# Patient Record
Sex: MALE | Race: WHITE | NOT HISPANIC OR LATINO | URBAN - METROPOLITAN AREA
[De-identification: names, ages, dates, MRNs, and addresses within clinical notes are randomized per-mention and may not be internally consistent; named-entity substitution may affect disease eponyms.]

---

## 2018-08-06 ENCOUNTER — INPATIENT (INPATIENT)
Facility: HOSPITAL | Age: 58
LOS: 9 days | Discharge: HOME | DRG: 949 | End: 2018-08-16
Attending: STUDENT IN AN ORGANIZED HEALTH CARE EDUCATION/TRAINING PROGRAM | Admitting: STUDENT IN AN ORGANIZED HEALTH CARE EDUCATION/TRAINING PROGRAM
Payer: MEDICAID

## 2018-08-06 VITALS
DIASTOLIC BLOOD PRESSURE: 87 MMHG | TEMPERATURE: 99 F | HEIGHT: 68 IN | OXYGEN SATURATION: 96 % | HEART RATE: 90 BPM | SYSTOLIC BLOOD PRESSURE: 136 MMHG | RESPIRATION RATE: 14 BRPM | WEIGHT: 192.68 LBS

## 2018-08-06 DIAGNOSIS — G95.89 OTHER SPECIFIED DISEASES OF SPINAL CORD: ICD-10-CM

## 2018-08-06 DIAGNOSIS — Z90.49 ACQUIRED ABSENCE OF OTHER SPECIFIED PARTS OF DIGESTIVE TRACT: Chronic | ICD-10-CM

## 2018-08-06 RX ORDER — LOSARTAN POTASSIUM 100 MG/1
25 TABLET, FILM COATED ORAL DAILY
Qty: 0 | Refills: 0 | Status: DISCONTINUED | OUTPATIENT
Start: 2018-08-06 | End: 2018-08-16

## 2018-08-06 RX ORDER — DEXAMETHASONE 0.5 MG/5ML
6 ELIXIR ORAL
Qty: 0 | Refills: 0 | Status: COMPLETED | OUTPATIENT
Start: 2018-08-07 | End: 2018-08-07

## 2018-08-06 RX ORDER — CALCIUM CARBONATE 500(1250)
1 TABLET ORAL EVERY 6 HOURS
Qty: 0 | Refills: 0 | Status: DISCONTINUED | OUTPATIENT
Start: 2018-08-06 | End: 2018-08-16

## 2018-08-06 RX ORDER — BACLOFEN 100 %
15 POWDER (GRAM) MISCELLANEOUS EVERY 12 HOURS
Qty: 0 | Refills: 0 | Status: DISCONTINUED | OUTPATIENT
Start: 2018-08-06 | End: 2018-08-09

## 2018-08-06 RX ORDER — AMLODIPINE BESYLATE 2.5 MG/1
5 TABLET ORAL DAILY
Qty: 0 | Refills: 0 | Status: DISCONTINUED | OUTPATIENT
Start: 2018-08-06 | End: 2018-08-16

## 2018-08-06 RX ORDER — ENOXAPARIN SODIUM 100 MG/ML
30 INJECTION SUBCUTANEOUS EVERY 12 HOURS
Qty: 0 | Refills: 0 | Status: DISCONTINUED | OUTPATIENT
Start: 2018-08-06 | End: 2018-08-16

## 2018-08-06 RX ORDER — GABAPENTIN 400 MG/1
600 CAPSULE ORAL
Qty: 0 | Refills: 0 | Status: DISCONTINUED | OUTPATIENT
Start: 2018-08-06 | End: 2018-08-16

## 2018-08-06 RX ORDER — SIMETHICONE 80 MG/1
80 TABLET, CHEWABLE ORAL
Qty: 0 | Refills: 0 | Status: DISCONTINUED | OUTPATIENT
Start: 2018-08-06 | End: 2018-08-16

## 2018-08-06 RX ORDER — DEXAMETHASONE 0.5 MG/5ML
ELIXIR ORAL
Qty: 0 | Refills: 0 | Status: DISCONTINUED | OUTPATIENT
Start: 2018-08-06 | End: 2018-08-06

## 2018-08-06 RX ORDER — ALPRAZOLAM 0.25 MG
0.25 TABLET ORAL THREE TIMES A DAY
Qty: 0 | Refills: 0 | Status: DISCONTINUED | OUTPATIENT
Start: 2018-08-06 | End: 2018-08-11

## 2018-08-06 RX ORDER — PANTOPRAZOLE SODIUM 20 MG/1
40 TABLET, DELAYED RELEASE ORAL
Qty: 0 | Refills: 0 | Status: DISCONTINUED | OUTPATIENT
Start: 2018-08-06 | End: 2018-08-16

## 2018-08-06 RX ORDER — ATORVASTATIN CALCIUM 80 MG/1
40 TABLET, FILM COATED ORAL AT BEDTIME
Qty: 0 | Refills: 0 | Status: DISCONTINUED | OUTPATIENT
Start: 2018-08-06 | End: 2018-08-13

## 2018-08-06 RX ADMIN — Medication 15 MILLIGRAM(S): at 22:55

## 2018-08-06 RX ADMIN — GABAPENTIN 600 MILLIGRAM(S): 400 CAPSULE ORAL at 22:56

## 2018-08-06 NOTE — H&P ADULT - ASSESSMENT
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    COMORBIDITES/ACTIVE MEDICAL ISSUES     Gait Instability, ADL impairments and Functional impairments: start Comprehensive Rehab Program of PT/OT     Neuro / Myelopathy 2/2 Radiation - PT, Bowel and bladder no issues.     Hem-Endo - NET - currently on somatostatin (q4 weeks) will need soon, will need follow up with primary care for repeat     Spasticity - c/w baclofen    Cardio - HTN/HLD - c/w home meds for bp control, pt takes crestor at home and decline atorvastatin.     Endo - DM 2/2 steroids - pt refused glucose monitoring and insulin coverage     Pain - Neuropathic - gabapentin 600mg BID, consider changing to TID for additional relief     GI/Bowel Mgmt -  Continent, moving bowel  well, not on meds; simethicone prn, tums prn     /Bladder Mgmt - Continent    FEN   - Diet - Regular + Thins  ( lactose free) recd CCHO diet but patient refused    Precautions / PROPHYLAXIS:   - Falls  - ortho: Weight bearing status: WBAT   - Lungs: Aspiration, Incentive Spirometer [patient instructed at the bedside]   - Pressure injury/Skin: Turn Q2hrs while in bed, OOB to Chair, PT/OT    - DVT: Lovenox, SCDs, TEDs     MEDICAL PROGNOSIS: GOOD            REHAB POTENTIAL: GOOD             ESTIMATED DISPOSITION: HOME            ELOS: 10-14 Days   EXPECTED THERAPY:     P.T. 1.5 hr/day       O.T. 1.5 hr/day      S.L.P. 0  P&O Unnecessary     EXP FREQUENCY: 5 days per 7 day period     PRESCREEN COMPARISION:   I have reviewed the prescreen information and I have found no relevant changes between the preadmission screening and my post admission evaluation     RATIONALE FOR INPATIENT ADMISSION - Patient demonstrates the following: (check all that apply)  [X] Medically appropriate for rehabilitation admission  [X] Has attainable rehab goals with an appropriate initial discharge plan  [X] Has rehabilitation potential (expected to make a significant improvement within a reasonable period of time)   [X] Requires close medical managment by a rehab physician, rehab nursing care, Hospitalist and comprehensive interdisciplinary team (including PT, OT, & or SLP, Prosthetics and Orthotics)

## 2018-08-06 NOTE — H&P ADULT - NSHPPHYSICALEXAM_GEN_ALL_CORE
PHYSICAL EXAMINATION     General: NAD, Resting Comfortable,  eating a Angie                                 HEENT: NC/AT, EOM I, PERRLA, Normal Conjunctivae  Cardio: RRR, Normal S1-S2, No M/G/R                              Pulm: No Respiratory Distress,  Lungs CTAB                        Abdomen: Distendend - NT, tense but BS+                                                MSK: No joint swelling, Full ROM                                         Ext: No C/C/E, Pulses 2+ throughout, No calf tenderness    Skin:  all skin intact                                                                 Wounds: none  Decubitus Ulcers: None Present     Neurological Examination    Cognitive: AAO x 3                                                                         Attention: Intact   Judgment: Good evidence of judgement                               Memory: Recall 3 objects immediate and 3 min later      Mood/Affect: wnl                                                                           Communication:  Fluent,  No dysarthria   Swallow: intact  CN II - XII  intact  Coordination: FTN/HTS intact on left, unable to perform on the right.                                                                               Sensory: diminished to light touch on the lower extremity                                                                           Tone: abnormal - MAS 1+ Right hamstring   Balance: impaired    Motor    LEFT    UE: SF [5/5], EF [5/5], EE [5/5], WE [5/5],  [wnl]  RIGHT UE: SF [5/5], EF [5/5], EE [5/5], WE [5/5],  [wnl]    LEFT    LE:  HF [5/5], KE [5/5], DF [5/5], EHL [5/5],  PF [5/5]  RIGHT LE:  HF [1/5], KE [4/5], DF [2/5], EHL [1/5],  PF [2/5]

## 2018-08-06 NOTE — H&P ADULT - ATTENDING COMMENTS
Patient discussed with Dr. Mario, PM&R PGY-3. 57 y.o M RHD w/ PMH of Neuroendocrine tumor, Cervical Metastasis s/p Somatostatin Injection,       Coordination: FTN/HTS intact on left, unable to perform on the right.                                                                               	Sensory: diminished to light touch on the lower extremity  	                                                                         	Tone: abnormal - MAS 1+ Right hamstring   	Balance: impaired    	Motor    	LEFT    UE: SF [5/5], EF [5/5], EE [5/5], WE [5/5],  [wnl]  	RIGHT UE: SF [5/5], EF [5/5], EE [5/5], WE [5/5],  [wnl]    	LEFT    LE:  HF [5/5], KE [5/5], DF [5/5], EHL [5/5],  PF [5/5]  RIGHT LE:  HF [1/5], KE [4/5], DF [2/5], EHL [1/5],  PF [2/5] Patient discussed with Dr. Mario, PM&R PGY-3. 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  On my physical exam: Decreased LT to bilateral lower extremities, DTR 3+ B/l Patellar, B/l Ankle Clonus Left 4-6 Beats, R 2-3 Beats, Right Hamstring MAS 1+. BUE 5/5, LLE 5/5 , RLE HF 2/5, KE 3/5, ADL 2/5, APL 3/5, EHL 2/5. Admit Labs with WBC 12k ( likely 2/2 steroid use), anemia and Elevated LFTs.    Admit to Rehab for radiation induced cervical myelopathy, spastic paraparesis, neuropathic pain, neurogenic bowel/bladder, impaired mobility and ADLs. Next Somatostatin Treatment with [facility] ?8/18.  ELOS 10-14 Days.

## 2018-08-06 NOTE — PATIENT PROFILE ADULT. - HEALTHCARE INFORMATION NEEDED, PROFILE
Would like to change room if possible. Needs more privacy. Patient lactose intolerent, can take ice cream, a little bit of milk

## 2018-08-06 NOTE — H&P ADULT - PMH
Anxiety    Endocrine tumor    Essential hypertension    Metastatic cancer    Myelopathy due to another disorder    Small bowel obstruction    Spasticity    Steroid-induced hyperglycemia

## 2018-08-06 NOTE — H&P ADULT - HISTORY OF PRESENT ILLNESS
Patient is a 57y old  Male who presents with a chief complaint of right lower extremity weakness, ADL and functional impairments.     HPI: 57 Man with PMH Of NET of the Small bowel (Dx 2013) s/p resection now with mets ( liver, LNs, Bone and possible C-spine s/p RT to C6-C7 completed 3/28/18), Small bowel obstruction, HTN, who had developed progressive Lower extremity weakness in November 2017. He was recently hospitalized in May 2018 2/2 radiation related myelopathy which initially responded well to steroids (requiring a long taper) and went to [facility] for Acute rehab.  He presented again on 7/24 for worsening weakness R>L lower extremity and decreased functional ability at home as well as low back pain. Weakness attributed to likely radiation treatment. Patient had imaging and showed no evidence of cord compression. Patient initiated back on steroids, patient wants to continue long term treatment with steroids and [place] [facility] [place].       Any Major Surgery within past 100 days? No   Two or > Falls within past one year?  Yes                   One Fall with injury past one year? No     FUNCTIONAL HISTORY  Lives alone in a home, 1 step to enter, can live on 1st level.   Prior Level of Function:  Independent prior with Ambulation and ADLs prior to November 2017 and had progressive weakness. Recently admitted to Acute Rehab at [place] a few months prior.     CURRENT FUNCTIONAL STATUS  - Bed Mobility: Supervision with rolling, bridging    - Transfers: Min A   - Gait: 30 feet with CG x 2 using RW   - ADLs: eval

## 2018-08-06 NOTE — H&P ADULT - NSHPREVIEWOFSYSTEMS_GEN_ALL_CORE
REVIEW OF SYSTEMS  Constitutional: No fever, No Chills, +fatigue  HEENT: No Dysphagia   Pulm: No cough, No shortness of breath  Cardio: No chest pain, No palpitations  GI:  No abdominal pain, No nausea, No vomiting, No diarrhea, No constipation, No incontinence, Last Bowel Movement was today (had 2 BM today which is normal for him)   : No dysuria, No frequency, No hematuria, No incontinence  Neuro: No headaches, No memory loss, +loss of strength, No numbness, No tremors, + Burning pain in feet.   Skin: No itching, No rashes, No lesions   Endo: No temperature intolerance  MSK: No joint pain, No joint swelling, +muscle pain in thigh, No Neck or back pain  Psych:  No depression, No anxiety

## 2018-08-07 LAB
ALBUMIN SERPL ELPH-MCNC: 3.1 G/DL — LOW (ref 3.3–5)
ALP SERPL-CCNC: 71 U/L — SIGNIFICANT CHANGE UP (ref 40–120)
ALT FLD-CCNC: 142 U/L DA — HIGH (ref 10–45)
ANION GAP SERPL CALC-SCNC: 6 MMOL/L — SIGNIFICANT CHANGE UP (ref 5–17)
AST SERPL-CCNC: 50 U/L — HIGH (ref 10–40)
BASOPHILS # BLD AUTO: 0 K/UL — SIGNIFICANT CHANGE UP (ref 0–0.2)
BASOPHILS NFR BLD AUTO: 0.3 % — SIGNIFICANT CHANGE UP (ref 0–2)
BILIRUB SERPL-MCNC: 0.3 MG/DL — SIGNIFICANT CHANGE UP (ref 0.2–1.2)
BUN SERPL-MCNC: 21 MG/DL — SIGNIFICANT CHANGE UP (ref 7–23)
CALCIUM SERPL-MCNC: 8.5 MG/DL — SIGNIFICANT CHANGE UP (ref 8.4–10.5)
CHLORIDE SERPL-SCNC: 102 MMOL/L — SIGNIFICANT CHANGE UP (ref 96–108)
CO2 SERPL-SCNC: 28 MMOL/L — SIGNIFICANT CHANGE UP (ref 22–31)
CREAT SERPL-MCNC: 0.71 MG/DL — SIGNIFICANT CHANGE UP (ref 0.5–1.3)
EOSINOPHIL # BLD AUTO: 0 K/UL — SIGNIFICANT CHANGE UP (ref 0–0.5)
EOSINOPHIL NFR BLD AUTO: 0 % — SIGNIFICANT CHANGE UP (ref 0–6)
GLUCOSE SERPL-MCNC: 101 MG/DL — HIGH (ref 70–99)
HCT VFR BLD CALC: 37.5 % — LOW (ref 39–50)
HGB BLD-MCNC: 12.1 G/DL — LOW (ref 13–17)
LYMPHOCYTES # BLD AUTO: 1.5 K/UL — SIGNIFICANT CHANGE UP (ref 1–3.3)
LYMPHOCYTES # BLD AUTO: 11.7 % — LOW (ref 13–44)
MCHC RBC-ENTMCNC: 29.8 PG — SIGNIFICANT CHANGE UP (ref 27–34)
MCHC RBC-ENTMCNC: 32.1 GM/DL — SIGNIFICANT CHANGE UP (ref 32–36)
MCV RBC AUTO: 92.9 FL — SIGNIFICANT CHANGE UP (ref 80–100)
MONOCYTES # BLD AUTO: 0.8 K/UL — SIGNIFICANT CHANGE UP (ref 0–0.9)
MONOCYTES NFR BLD AUTO: 6.6 % — SIGNIFICANT CHANGE UP (ref 1–9)
NEUTROPHILS # BLD AUTO: 10.2 K/UL — HIGH (ref 1.8–7.4)
NEUTROPHILS NFR BLD AUTO: 81.4 % — HIGH (ref 43–77)
PLATELET # BLD AUTO: 169 K/UL — SIGNIFICANT CHANGE UP (ref 150–400)
POTASSIUM SERPL-MCNC: 4.8 MMOL/L — SIGNIFICANT CHANGE UP (ref 3.5–5.3)
POTASSIUM SERPL-SCNC: 4.8 MMOL/L — SIGNIFICANT CHANGE UP (ref 3.5–5.3)
PROT SERPL-MCNC: 5.6 G/DL — LOW (ref 6–8.3)
RBC # BLD: 4.04 M/UL — LOW (ref 4.2–5.8)
RBC # FLD: 15.1 % — HIGH (ref 10.3–14.5)
SODIUM SERPL-SCNC: 136 MMOL/L — SIGNIFICANT CHANGE UP (ref 135–145)
WBC # BLD: 12.5 K/UL — HIGH (ref 3.8–10.5)
WBC # FLD AUTO: 12.5 K/UL — HIGH (ref 3.8–10.5)

## 2018-08-07 PROCEDURE — 99223 1ST HOSP IP/OBS HIGH 75: CPT

## 2018-08-07 RX ORDER — DEXAMETHASONE 0.5 MG/5ML
4 ELIXIR ORAL
Qty: 0 | Refills: 0 | Status: DISCONTINUED | OUTPATIENT
Start: 2018-08-13 | End: 2018-08-13

## 2018-08-07 RX ORDER — DEXTROSE 50 % IN WATER 50 %
25 SYRINGE (ML) INTRAVENOUS ONCE
Qty: 0 | Refills: 0 | Status: DISCONTINUED | OUTPATIENT
Start: 2018-08-07 | End: 2018-08-16

## 2018-08-07 RX ORDER — DEXTROSE 50 % IN WATER 50 %
12.5 SYRINGE (ML) INTRAVENOUS ONCE
Qty: 0 | Refills: 0 | Status: DISCONTINUED | OUTPATIENT
Start: 2018-08-07 | End: 2018-08-16

## 2018-08-07 RX ORDER — DEXTROSE 50 % IN WATER 50 %
15 SYRINGE (ML) INTRAVENOUS ONCE
Qty: 0 | Refills: 0 | Status: DISCONTINUED | OUTPATIENT
Start: 2018-08-07 | End: 2018-08-16

## 2018-08-07 RX ORDER — INSULIN LISPRO 100/ML
VIAL (ML) SUBCUTANEOUS AT BEDTIME
Qty: 0 | Refills: 0 | Status: DISCONTINUED | OUTPATIENT
Start: 2018-08-07 | End: 2018-08-11

## 2018-08-07 RX ORDER — DEXAMETHASONE 0.5 MG/5ML
4 ELIXIR ORAL
Qty: 0 | Refills: 0 | Status: DISCONTINUED | OUTPATIENT
Start: 2018-08-08 | End: 2018-08-11

## 2018-08-07 RX ORDER — SODIUM CHLORIDE 9 MG/ML
1000 INJECTION, SOLUTION INTRAVENOUS
Qty: 0 | Refills: 0 | Status: DISCONTINUED | OUTPATIENT
Start: 2018-08-07 | End: 2018-08-16

## 2018-08-07 RX ORDER — GLUCAGON INJECTION, SOLUTION 0.5 MG/.1ML
1 INJECTION, SOLUTION SUBCUTANEOUS ONCE
Qty: 0 | Refills: 0 | Status: DISCONTINUED | OUTPATIENT
Start: 2018-08-07 | End: 2018-08-16

## 2018-08-07 RX ORDER — DEXAMETHASONE 0.5 MG/5ML
6 ELIXIR ORAL
Qty: 0 | Refills: 0 | Status: COMPLETED | OUTPATIENT
Start: 2018-08-08 | End: 2018-08-12

## 2018-08-07 RX ORDER — INSULIN LISPRO 100/ML
VIAL (ML) SUBCUTANEOUS
Qty: 0 | Refills: 0 | Status: DISCONTINUED | OUTPATIENT
Start: 2018-08-07 | End: 2018-08-11

## 2018-08-07 RX ADMIN — Medication 0.25 MILLIGRAM(S): at 22:48

## 2018-08-07 RX ADMIN — Medication 15 MILLIGRAM(S): at 06:28

## 2018-08-07 RX ADMIN — LOSARTAN POTASSIUM 25 MILLIGRAM(S): 100 TABLET, FILM COATED ORAL at 06:28

## 2018-08-07 RX ADMIN — AMLODIPINE BESYLATE 5 MILLIGRAM(S): 2.5 TABLET ORAL at 06:28

## 2018-08-07 RX ADMIN — GABAPENTIN 600 MILLIGRAM(S): 400 CAPSULE ORAL at 06:28

## 2018-08-07 RX ADMIN — Medication 6 MILLIGRAM(S): at 17:54

## 2018-08-07 RX ADMIN — Medication 6 MILLIGRAM(S): at 09:28

## 2018-08-07 RX ADMIN — GABAPENTIN 600 MILLIGRAM(S): 400 CAPSULE ORAL at 17:54

## 2018-08-07 RX ADMIN — PANTOPRAZOLE SODIUM 40 MILLIGRAM(S): 20 TABLET, DELAYED RELEASE ORAL at 06:28

## 2018-08-07 RX ADMIN — Medication 0.25 MILLIGRAM(S): at 02:42

## 2018-08-07 RX ADMIN — ENOXAPARIN SODIUM 30 MILLIGRAM(S): 100 INJECTION SUBCUTANEOUS at 17:59

## 2018-08-07 RX ADMIN — Medication 10 MILLIGRAM(S): at 17:58

## 2018-08-07 NOTE — PROGRESS NOTE ADULT - SUBJECTIVE AND OBJECTIVE BOX
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 He was recently hospitalized in May 2018 2/2 radiation related myelopathy which initially responded well to steroids (requiring a long taper) and went to [facility] for Acute rehab.  He presented again on 7/24 for worsening weakness R>L lower extremity and decreased functional ability at home as well as low back pain. Weakness attributed to likely radiation treatment. Patient had imaging and showed no evidence of cord compression. Patient initiated back on steroids, patient wants to continue long term treatment with steroids and declined [place] [facility] [place] but was discharged on taper.     Subjective: Patient seen and examined at bedside. Reports pain is well controlled. Continent of bowel and bladder. Reports he gets to bathroom with help of staff.     [X] Constitutional WNL        [X] Cardio WNL              [X] Resp WNL  [X] GI WNL                        [X]  WNL                   [  ] Heme WNL  [X] Endo WNL                   [X] Skin WNL                  [  ] MSK WNL  [  ] Neuro WNL                 [X] Cognitive WNL          [X] Psych WNL    Physical Exam:  Vital Signs Last 24 Hrs  T(C): 36.3 (07 Aug 2018 07:29), Max: 37.1 (06 Aug 2018 20:01)  T(F): 97.3 (07 Aug 2018 07:29), Max: 98.7 (06 Aug 2018 20:01)  HR: 71 (07 Aug 2018 07:29) (68 - 90)  BP: 156/87 (07 Aug 2018 07:29) (135/79 - 156/87)  BP(mean): --  RR: 14 (07 Aug 2018 07:29) (14 - 14)  SpO2: 96% (07 Aug 2018 07:29) (96% - 97%)    Constitutional - NAD, Comfortable  Chest - CTAB  Cardiovascular - RRR, S1S2, no m/r/g  Abdomen - BS+, Soft, NTND  Extremities - No C/C/E, No calf tenderness   Neurologic Exam -                    Cognitive - Awake, Alert, AAO to self, place, date, year, situation     Communication - Fluent, No dysarthria     Motor - No focal deficits                    LEFT    UE - ShAB 5/5, EF 5/5, EE 5/5, WE 5/5,  5/5                    RIGHT UE - ShAB 5/5, EF 5/5, EE 5/5, WE 5/5,  5/5                    LEFT    LE - HF 5/5, KE 5/5, DF 5/5, PF 5/5                    RIGHT LE - HF 1/5, KE 4+/5, DF 3/5, PF 3/5        Sensory - Intact to LT     Reflexes - 2+ b/l patellar, symmetric  Psychiatric - Mood stable, Affect WNL    FUNCTIONAL STATUS  - Bed Mobility: Supervision with rolling, bridging    - Transfers: Min A   - Gait: 30 feet with CG x 2 using RW   - ADLs: eval (06 Aug 2018 20:59)    MEDICATIONS  (STANDING):  amLODIPine   Tablet 5 milliGRAM(s) Oral daily  atorvastatin 40 milliGRAM(s) Oral at bedtime  baclofen 15 milliGRAM(s) Oral every 12 hours  dexamethasone     Tablet 6 milliGRAM(s) Oral <User Schedule>  dextrose 5%. 1000 milliLiter(s) (50 mL/Hr) IV Continuous <Continuous>  dextrose 50% Injectable 12.5 Gram(s) IV Push once  dextrose 50% Injectable 25 Gram(s) IV Push once  dextrose 50% Injectable 25 Gram(s) IV Push once  enoxaparin Injectable 30 milliGRAM(s) SubCutaneous every 12 hours  gabapentin 600 milliGRAM(s) Oral two times a day  insulin lispro (HumaLOG) corrective regimen sliding scale   SubCutaneous three times a day before meals  insulin lispro (HumaLOG) corrective regimen sliding scale   SubCutaneous at bedtime  losartan 25 milliGRAM(s) Oral daily  multivitamin 1 Tablet(s) Oral daily  pantoprazole    Tablet 40 milliGRAM(s) Oral before breakfast  trimethoprim  160 mG/sulfamethoxazole 800 mG 1 Tablet(s) Oral <User Schedule>    MEDICATIONS  (PRN):  ALPRAZolam 0.25 milliGRAM(s) Oral three times a day PRN anxiety  calcium carbonate    500 mG (Tums) Chewable 1 Tablet(s) Chew every 6 hours PRN Indigestion  dextrose 40% Gel 15 Gram(s) Oral once PRN Blood Glucose LESS THAN 70 milliGRAM(s)/deciliter  glucagon  Injectable 1 milliGRAM(s) IntraMuscular once PRN Glucose LESS THAN 70 milligrams/deciliter  simethicone 80 milliGRAM(s) Chew two times a day PRN Gas    RECENT LABS/IMAGING                        12.1   12.5  )-----------( 169      ( 07 Aug 2018 05:05 )             37.5     08-07    136  |  102  |  21  ----------------------------<  101<H>  4.8   |  28  |  0.71    Ca    8.5      07 Aug 2018 05:05    TPro  5.6<L>  /  Alb  3.1<L>  /  TBili  0.3  /  DBili  x   /  AST  50<H>  /  ALT  142<H>  /  AlkPhos  71  08-07    ASSESSMENT/PLAN  SAM WALTER is a 57 year-old Man with a 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    COMORBIDITES/ACTIVE MEDICAL ISSUES     Rehab: Gait Instability, ADL impairments and Functional impairments: start Comprehensive Rehab Program of PT/OT     Neuro/Myelopathy 2/2 Radiation - PT, Bowel and bladder no issues.   - Spasticity: c/w baclofen  - Pain: Neuropathic - gabapentin 600mg BID  - Anxiety: Xanax prn    Hem-Onc - metastatic NET  - currently on somatostatin (q4 weeks) will need soon, will need follow up with primary care for repeat   - decadron taper; c/w bactrim ppx while on decadron    Cardio - HTN/HLD - c/w home meds for bp control, pt takes crestor at home and decline atorvastatin.     Endo - DM 2/2 steroids   - hypoglycemia protocol and sliding scale    GI/Bowel Mgmt -  Continent, moving bowel  well, not on meds; simethicone prn, tums prn     /Bladder Mgmt - Continent    FEN   - Diet - Regular + Thins  (lactose free) recd CCHO diet but patient refused    Precautions / PROPHYLAXIS:   - Falls  - ortho: Weight bearing status: WBAT   - Lungs: Aspiration, Incentive Spirometer [patient instructed at the bedside]   - Pressure injury/Skin: Turn Q2hrs while in bed, OOB to Chair, PT/OT    - DVT: Lovenox, SCDs    Dispo: Pending team meeting DAILY PROGRESS NOTE:  HPI: 57 Man with 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 He was recently hospitalized in May 2018 2/2 radiation related myelopathy which initially responded well to steroids (requiring a long taper) and went to [facility] for Acute rehab.  He presented again on 7/24 for worsening weakness R>L lower extremity and decreased functional ability at home as well as low back pain. Weakness attributed to likely radiation treatment. Patient had imaging and showed no evidence of cord compression. Patient initiated back on steroids, patient wants to continue long term treatment with steroids and declined [place] [facility] [place] but was discharged on taper.     Subjective:   Patient seen and examined at bedside. Reports pain is well controlled. Continent of bowel and bladder. Reports he gets to bathroom with help of staff.     [X] Constitutional WNL        [X] Cardio WNL              [X] Resp WNL  [X] GI WNL                        [X]  WNL                   [  ] Heme WNL  [X] Endo WNL                   [X] Skin WNL                  [  ] MSK WNL  [  ] Neuro WNL                 [X] Cognitive WNL          [X] Psych WNL    Physical Exam:  Vital Signs Last 24 Hrs  T(C): 36.3 (07 Aug 2018 07:29), Max: 37.1 (06 Aug 2018 20:01)  T(F): 97.3 (07 Aug 2018 07:29), Max: 98.7 (06 Aug 2018 20:01)  HR: 71 (07 Aug 2018 07:29) (68 - 90)  BP: 156/87 (07 Aug 2018 07:29) (135/79 - 156/87)  BP(mean): --  RR: 14 (07 Aug 2018 07:29) (14 - 14)  SpO2: 96% (07 Aug 2018 07:29) (96% - 97%)    Constitutional - NAD, Comfortable  Chest - CTAB  Cardiovascular - RRR, S1S2, no m/r/g  Abdomen - BS+, Soft, NTND  Extremities - No C/C/E, No calf tenderness   Neurologic Exam -                    Cognitive - Awake, Alert, AAO to self, place, date, year, situation     Communication - Fluent, No dysarthria     Motor - No focal deficits                    LEFT    UE - ShAB 5/5, EF 5/5, EE 5/5, WE 5/5,  5/5                    RIGHT UE - ShAB 5/5, EF 5/5, EE 5/5, WE 5/5,  5/5                    LEFT    LE - HF 5/5, KE 5/5, DF 5/5, PF 5/5                    RIGHT LE - HF 1/5, KE 4+/5, DF 3/5, PF 3/5        Sensory - Intact to LT     Reflexes - 2+ b/l patellar, symmetric  Psychiatric - Mood stable, Affect WNL    FUNCTIONAL STATUS  - Bed Mobility: Supervision with rolling, bridging    - Transfers: Min A   - Gait: 30 feet with CG x 2 using RW   - ADLs: eval (06 Aug 2018 20:59)    MEDICATIONS  (STANDING):  amLODIPine   Tablet 5 milliGRAM(s) Oral daily  atorvastatin 40 milliGRAM(s) Oral at bedtime  baclofen 15 milliGRAM(s) Oral every 12 hours  dexamethasone     Tablet 6 milliGRAM(s) Oral <User Schedule>  dextrose 5%. 1000 milliLiter(s) (50 mL/Hr) IV Continuous <Continuous>  dextrose 50% Injectable 12.5 Gram(s) IV Push once  dextrose 50% Injectable 25 Gram(s) IV Push once  dextrose 50% Injectable 25 Gram(s) IV Push once  enoxaparin Injectable 30 milliGRAM(s) SubCutaneous every 12 hours  gabapentin 600 milliGRAM(s) Oral two times a day  insulin lispro (HumaLOG) corrective regimen sliding scale   SubCutaneous three times a day before meals  insulin lispro (HumaLOG) corrective regimen sliding scale   SubCutaneous at bedtime  losartan 25 milliGRAM(s) Oral daily  multivitamin 1 Tablet(s) Oral daily  pantoprazole    Tablet 40 milliGRAM(s) Oral before breakfast  trimethoprim  160 mG/sulfamethoxazole 800 mG 1 Tablet(s) Oral <User Schedule>    MEDICATIONS  (PRN):  ALPRAZolam 0.25 milliGRAM(s) Oral three times a day PRN anxiety  calcium carbonate    500 mG (Tums) Chewable 1 Tablet(s) Chew every 6 hours PRN Indigestion  dextrose 40% Gel 15 Gram(s) Oral once PRN Blood Glucose LESS THAN 70 milliGRAM(s)/deciliter  glucagon  Injectable 1 milliGRAM(s) IntraMuscular once PRN Glucose LESS THAN 70 milligrams/deciliter  simethicone 80 milliGRAM(s) Chew two times a day PRN Gas    RECENT LABS/IMAGING                        12.1   12.5  )-----------( 169      ( 07 Aug 2018 05:05 )             37.5     08-07    136  |  102  |  21  ----------------------------<  101<H>  4.8   |  28  |  0.71    Ca    8.5      07 Aug 2018 05:05    TPro  5.6<L>  /  Alb  3.1<L>  /  TBili  0.3  /  DBili  x   /  AST  50<H>  /  ALT  142<H>  /  AlkPhos  71  08-07    ASSESSMENT/PLAN  SAM WALTER is a 57 year-old Man with a 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    COMORBIDITES/ACTIVE MEDICAL ISSUES     Rehab: Gait Instability, ADL impairments and Functional impairments: start Comprehensive Rehab Program of PT/OT     Neuro/Myelopathy 2/2 Radiation - PT, Bowel and bladder no issues.   - Spasticity: c/w baclofen  - Pain: Neuropathic - gabapentin 600mg BID  - Anxiety: Xanax prn    Hem-Onc - metastatic NET  - currently on somatostatin (q4 weeks) will need soon, will need follow up with primary care for repeat   - decadron taper; c/w bactrim ppx while on decadron    Cardio - HTN/HLD - c/w home meds for bp control, pt takes crestor at home and decline atorvastatin.     Endo - DM 2/2 steroids   - hypoglycemia protocol and sliding scale    GI/Bowel Mgmt -  Continent, moving bowel  well, not on meds; simethicone prn, tums prn     /Bladder Mgmt - Continent    FEN   - Diet - Regular + Thins  (lactose free) recd CCHO diet but patient refused    Precautions / PROPHYLAXIS:   - Falls  - ortho: Weight bearing status: WBAT   - Lungs: Aspiration, Incentive Spirometer [patient instructed at the bedside]   - Pressure injury/Skin: Turn Q2hrs while in bed, OOB to Chair, PT/OT    - DVT: Lovenox, SCDs    Dispo: Pending team meeting

## 2018-08-08 LAB — HBA1C BLD-MCNC: 4.7 % — SIGNIFICANT CHANGE UP (ref 4–5.6)

## 2018-08-08 PROCEDURE — 99232 SBSQ HOSP IP/OBS MODERATE 35: CPT

## 2018-08-08 RX ADMIN — ENOXAPARIN SODIUM 30 MILLIGRAM(S): 100 INJECTION SUBCUTANEOUS at 17:47

## 2018-08-08 RX ADMIN — Medication 4 MILLIGRAM(S): at 17:54

## 2018-08-08 RX ADMIN — Medication 1 TABLET(S): at 08:16

## 2018-08-08 RX ADMIN — GABAPENTIN 600 MILLIGRAM(S): 400 CAPSULE ORAL at 06:50

## 2018-08-08 RX ADMIN — Medication 15 MILLIGRAM(S): at 17:54

## 2018-08-08 RX ADMIN — PANTOPRAZOLE SODIUM 40 MILLIGRAM(S): 20 TABLET, DELAYED RELEASE ORAL at 06:51

## 2018-08-08 RX ADMIN — GABAPENTIN 600 MILLIGRAM(S): 400 CAPSULE ORAL at 17:49

## 2018-08-08 RX ADMIN — Medication 15 MILLIGRAM(S): at 06:51

## 2018-08-08 RX ADMIN — AMLODIPINE BESYLATE 5 MILLIGRAM(S): 2.5 TABLET ORAL at 06:51

## 2018-08-08 RX ADMIN — LOSARTAN POTASSIUM 25 MILLIGRAM(S): 100 TABLET, FILM COATED ORAL at 06:51

## 2018-08-08 RX ADMIN — Medication 6 MILLIGRAM(S): at 08:15

## 2018-08-08 NOTE — PROGRESS NOTE ADULT - SUBJECTIVE AND OBJECTIVE BOX
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 He was recently hospitalized in May 2018 2/2 radiation related myelopathy which initially responded well to steroids (requiring a long taper) and went to [facility] for Acute rehab.  He presented again on 7/24 for worsening weakness R>L lower extremity and decreased functional ability at home as well as low back pain. Weakness attributed to likely radiation treatment. Patient had imaging and showed no evidence of cord compression. Patient initiated back on steroids, patient wants to continue long term treatment with steroids and declined taper at [facility] [place] but was discharged on taper.     Subjective:   Patient seen and examined at bedside. Reports he had one bowel movement this AM. Expressed wanting to get as much therapy as he can. Also requesting to have his Somatostatin injection a few days after discharge from rehab.      [X] Constitutional WNL        [X] Cardio WNL              [X] Resp WNL  [X] GI WNL                        [X]  WNL                   [  ] Heme WNL  [X] Endo WNL                   [X] Skin WNL                  [  ] MSK WNL  [  ] Neuro WNL                 [X] Cognitive WNL          [X] Psych WNL    Physical Exam:  Vital Signs Last 24 Hrs  T(C): 36.6 (08 Aug 2018 07:00), Max: 36.9 (07 Aug 2018 20:11)  T(F): 97.8 (08 Aug 2018 07:00), Max: 98.4 (07 Aug 2018 20:11)  HR: 80 (08 Aug 2018 07:00) (80 - 86)  BP: 137/73 (08 Aug 2018 07:00) (122/76 - 148/87)  BP(mean): --  RR: 15 (08 Aug 2018 07:00) (14 - 15)  SpO2: 95% (08 Aug 2018 07:00) (95% - 97%)    Constitutional - NAD, Comfortable  Chest - CTAB  Cardiovascular - RRR, S1S2, no m/r/g  Abdomen - BS+, Soft, NTND  Extremities - No C/C/E, No calf tenderness   Neurologic Exam -                    Cognitive - Awake, Alert, AAO to self, place, date, year, situation     Communication - Fluent, No dysarthria     Motor - No focal deficits                    LEFT    UE - ShAB 5/5, EF 5/5, EE 5/5, WE 5/5,  5/5                    RIGHT UE - ShAB 5/5, EF 5/5, EE 5/5, WE 5/5,  5/5                    LEFT    LE - HF 5/5, KE 5/5, DF 5/5, PF 5/5                    RIGHT LE - HF 1/5, KE 4+/5, DF 3/5, PF 3/5        Sensory - Intact to LT     Reflexes - 2+ b/l patellar, symmetric  Psychiatric - Mood stable, Affect WNL    FUNCTIONAL STATUS  - Bed Mobility: Supervision with rolling, bridging    - Transfers: Min A   - Gait: 30 feet with CG x 2 using RW   - ADLs: eval (06 Aug 2018 20:59)    MEDICATIONS  (STANDING):  amLODIPine   Tablet 5 milliGRAM(s) Oral daily  atorvastatin 40 milliGRAM(s) Oral at bedtime  baclofen 15 milliGRAM(s) Oral every 12 hours  dexamethasone     Tablet 6 milliGRAM(s) Oral <User Schedule>  dextrose 5%. 1000 milliLiter(s) (50 mL/Hr) IV Continuous <Continuous>  dextrose 50% Injectable 12.5 Gram(s) IV Push once  dextrose 50% Injectable 25 Gram(s) IV Push once  dextrose 50% Injectable 25 Gram(s) IV Push once  enoxaparin Injectable 30 milliGRAM(s) SubCutaneous every 12 hours  gabapentin 600 milliGRAM(s) Oral two times a day  insulin lispro (HumaLOG) corrective regimen sliding scale   SubCutaneous three times a day before meals  insulin lispro (HumaLOG) corrective regimen sliding scale   SubCutaneous at bedtime  losartan 25 milliGRAM(s) Oral daily  multivitamin 1 Tablet(s) Oral daily  pantoprazole    Tablet 40 milliGRAM(s) Oral before breakfast  trimethoprim  160 mG/sulfamethoxazole 800 mG 1 Tablet(s) Oral <User Schedule>    MEDICATIONS  (PRN):  ALPRAZolam 0.25 milliGRAM(s) Oral three times a day PRN anxiety  calcium carbonate    500 mG (Tums) Chewable 1 Tablet(s) Chew every 6 hours PRN Indigestion  dextrose 40% Gel 15 Gram(s) Oral once PRN Blood Glucose LESS THAN 70 milliGRAM(s)/deciliter  glucagon  Injectable 1 milliGRAM(s) IntraMuscular once PRN Glucose LESS THAN 70 milligrams/deciliter  simethicone 80 milliGRAM(s) Chew two times a day PRN Gas    RECENT LABS/IMAGING                        12.1   12.5  )-----------( 169      ( 07 Aug 2018 05:05 )             37.5     08-07    136  |  102  |  21  ----------------------------<  101<H>  4.8   |  28  |  0.71    Ca    8.5      07 Aug 2018 05:05    TPro  5.6<L>  /  Alb  3.1<L>  /  TBili  0.3  /  DBili  x   /  AST  50<H>  /  ALT  142<H>  /  AlkPhos  71  08-07    CAPILLARY BLOOD GLUCOSE      POCT Blood Glucose.: 122 mg/dL (08 Aug 2018 08:04)  POCT Blood Glucose.: 155 mg/dL (07 Aug 2018 21:29)  POCT Blood Glucose.: 125 mg/dL (07 Aug 2018 16:59)  POCT Blood Glucose.: 110 mg/dL (07 Aug 2018 12:17)      ASSESSMENT/PLAN  SAM WALTER is a 57 year-old Man with a 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    COMORBIDITES/ACTIVE MEDICAL ISSUES     Rehab: Gait Instability, ADL impairments and Functional impairments: start Comprehensive Rehab Program of PT/OT     Neuro/Myelopathy 2/2 Radiation - PT, Bowel and bladder no issues.   - Spasticity: c/w baclofen  - Pain: Neuropathic - gabapentin 600mg BID  - Anxiety: Xanax prn    Hem-Onc - metastatic NET  - currently on somatostatin (q4 weeks). Have spoken withRN of Dr. Ayala, Oncologist, ( [phone]) and Somatostatin inject can be delayed by 1 week ( 8/20 or 8/21). Will discuss with rehab team about discharge planning.   - decadron taper; c/w bactrim ppx while on decadron    Cardio - HTN/HLD - c/w home meds for bp control, pt takes crestor at home and decline atorvastatin.     Endo - DM 2/2 steroids   - hypoglycemia protocol and sliding scale    GI/Bowel Mgmt -  Continent, moving bowel  well, not on meds; simethicone prn, tums prn     /Bladder Mgmt - Continent    FEN   - Diet - Regular + Thins  (lactose free) recd CCHO diet but patient refused    Precautions / PROPHYLAXIS:   - Falls  - ortho: Weight bearing status: WBAT   - Lungs: Aspiration, Incentive Spirometer    - Pressure injury/Skin: Turn Q2hrs while in bed, OOB to Chair, PT/OT    - DVT: Lovenox, SCDs    Dispo: Pending team meeting

## 2018-08-09 PROCEDURE — 99233 SBSQ HOSP IP/OBS HIGH 50: CPT

## 2018-08-09 PROCEDURE — 99232 SBSQ HOSP IP/OBS MODERATE 35: CPT

## 2018-08-09 RX ORDER — BACLOFEN 100 %
20 POWDER (GRAM) MISCELLANEOUS EVERY 12 HOURS
Qty: 0 | Refills: 0 | Status: DISCONTINUED | OUTPATIENT
Start: 2018-08-09 | End: 2018-08-14

## 2018-08-09 RX ADMIN — Medication 20 MILLIGRAM(S): at 17:15

## 2018-08-09 RX ADMIN — GABAPENTIN 600 MILLIGRAM(S): 400 CAPSULE ORAL at 06:19

## 2018-08-09 RX ADMIN — LOSARTAN POTASSIUM 25 MILLIGRAM(S): 100 TABLET, FILM COATED ORAL at 06:19

## 2018-08-09 RX ADMIN — GABAPENTIN 600 MILLIGRAM(S): 400 CAPSULE ORAL at 17:14

## 2018-08-09 RX ADMIN — PANTOPRAZOLE SODIUM 40 MILLIGRAM(S): 20 TABLET, DELAYED RELEASE ORAL at 06:19

## 2018-08-09 RX ADMIN — Medication 15 MILLIGRAM(S): at 06:19

## 2018-08-09 RX ADMIN — Medication 0.25 MILLIGRAM(S): at 02:06

## 2018-08-09 RX ADMIN — AMLODIPINE BESYLATE 5 MILLIGRAM(S): 2.5 TABLET ORAL at 06:19

## 2018-08-09 RX ADMIN — Medication 0.25 MILLIGRAM(S): at 23:38

## 2018-08-09 RX ADMIN — Medication 4 MILLIGRAM(S): at 17:14

## 2018-08-09 RX ADMIN — Medication 6 MILLIGRAM(S): at 08:08

## 2018-08-09 RX ADMIN — ENOXAPARIN SODIUM 30 MILLIGRAM(S): 100 INJECTION SUBCUTANEOUS at 06:19

## 2018-08-09 NOTE — PROGRESS NOTE ADULT - SUBJECTIVE AND OBJECTIVE BOX
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 He was recently hospitalized in May 2018 2/2 radiation related myelopathy which initially responded well to steroids (requiring a long taper) and went to [facility] for Acute rehab.  He presented again on 7/24 for worsening weakness R>L lower extremity and decreased functional ability at home as well as low back pain. Weakness attributed to likely radiation treatment. Patient had imaging and showed no evidence of cord compression. Patient initiated back on steroids, patient wants to continue long term treatment with steroids and declined taper at [facility] [place] but was discharged on taper.     Subjective:   Patient seen and examined in PT gym. Denies pain, no complaints. Expressed wanting to get as much therapy as he can, even if his injection was delayed a few days.       [X] Constitutional WNL        [X] Cardio WNL              [X] Resp WNL  [X] GI WNL                        [X]  WNL                   [  ] Heme WNL  [X] Endo WNL                   [X] Skin WNL                  [  ] MSK WNL  [  ] Neuro WNL                 [X] Cognitive WNL          [X] Psych WNL    Physical Exam:  Vital Signs Last 24 Hrs  T(C): 36.6 (08 Aug 2018 07:00), Max: 36.9 (07 Aug 2018 20:11)  T(F): 97.8 (08 Aug 2018 07:00), Max: 98.4 (07 Aug 2018 20:11)  HR: 80 (08 Aug 2018 07:00) (80 - 86)  BP: 137/73 (08 Aug 2018 07:00) (122/76 - 148/87)  BP(mean): --  RR: 15 (08 Aug 2018 07:00) (14 - 15)  SpO2: 95% (08 Aug 2018 07:00) (95% - 97%)    Constitutional - NAD, Comfortable  Chest - CTAB  Cardiovascular - RRR, S1S2, no m/r/g  Abdomen - BS+, Soft, NTND  Extremities - No C/C/E, No calf tenderness   Neurologic Exam -                    Cognitive - Awake, Alert, AAO to self, place, date, year, situation     Communication - Fluent, No dysarthria     Motor - No focal deficits                    LEFT    UE - ShAB 5/5, EF 5/5, EE 5/5, WE 5/5,  5/5                    RIGHT UE - ShAB 5/5, EF 5/5, EE 5/5, WE 5/5,  5/5                    LEFT    LE - HF 5/5, KE 5/5, DF 5/5, PF 5/5                    RIGHT LE - HF 1/5, KE 4+/5, DF 3/5, PF 3/5        Sensory - Intact to LT     Reflexes - 2+ b/l patellar, symmetric  Psychiatric - Mood stable, Affect WNL    FUNCTIONAL STATUS  - Bed Mobility: Supervision with rolling, bridging    - Transfers: Min A  - Gait: 75ft RW CG/min A  - ADLs: Mod A - Sup; Transfers Min A    MEDICATIONS  (STANDING):  amLODIPine   Tablet 5 milliGRAM(s) Oral daily  atorvastatin 40 milliGRAM(s) Oral at bedtime  baclofen 15 milliGRAM(s) Oral every 12 hours  dexamethasone     Tablet 6 milliGRAM(s) Oral with breakfast  dexamethasone     Tablet 4 milliGRAM(s) Oral with dinner  dextrose 5%. 1000 milliLiter(s) (50 mL/Hr) IV Continuous <Continuous>  dextrose 50% Injectable 12.5 Gram(s) IV Push once  dextrose 50% Injectable 25 Gram(s) IV Push once  dextrose 50% Injectable 25 Gram(s) IV Push once  enoxaparin Injectable 30 milliGRAM(s) SubCutaneous every 12 hours  gabapentin 600 milliGRAM(s) Oral two times a day  insulin lispro (HumaLOG) corrective regimen sliding scale   SubCutaneous three times a day before meals  insulin lispro (HumaLOG) corrective regimen sliding scale   SubCutaneous at bedtime  losartan 25 milliGRAM(s) Oral daily  multivitamin 1 Tablet(s) Oral daily  pantoprazole    Tablet 40 milliGRAM(s) Oral before breakfast  trimethoprim  160 mG/sulfamethoxazole 800 mG 1 Tablet(s) Oral <User Schedule>    MEDICATIONS  (PRN):  ALPRAZolam 0.25 milliGRAM(s) Oral three times a day PRN anxiety  calcium carbonate    500 mG (Tums) Chewable 1 Tablet(s) Chew every 6 hours PRN Indigestion  dextrose 40% Gel 15 Gram(s) Oral once PRN Blood Glucose LESS THAN 70 milliGRAM(s)/deciliter  glucagon  Injectable 1 milliGRAM(s) IntraMuscular once PRN Glucose LESS THAN 70 milligrams/deciliter  simethicone 80 milliGRAM(s) Chew two times a day PRN Gas    RECENT LABS/IMAGING                        12.1   12.5  )-----------( 169      ( 07 Aug 2018 05:05 )             37.5     08-07    136  |  102  |  21  ----------------------------<  101<H>  4.8   |  28  |  0.71    Ca    8.5      07 Aug 2018 05:05    TPro  5.6<L>  /  Alb  3.1<L>  /  TBili  0.3  /  DBili  x   /  AST  50<H>  /  ALT  142<H>  /  AlkPhos  71  08-07    ASSESSMENT/PLAN  SAM WALTER is a 57 year-old Man with a 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    COMORBIDITIES/ACTIVE MEDICAL ISSUES     Rehab: Gait Instability, ADL impairments and Functional impairments: start Comprehensive Rehab Program of PT/OT   - R AFO for foot drop    Neuro/Myelopathy 2/2 Radiation - PT, Bowel and bladder no issues.   - Spasticity: c/w baclofen  - Pain: Neuropathic - gabapentin 600mg BID  - Anxiety: Xanax prn    Hem-Onc - metastatic NET  - currently on somatostatin (q4 weeks). Have spoken with RN of Dr. Ayala, Oncologist, ( [phone]) and Somatostatin inject can be delayed by 1 week ( 8/20 or 8/21).    - decadron taper; c/w bactrim ppx while on decadron    Cardio - HTN/HLD - c/w home meds for bp control, pt takes crestor at home and decline atorvastatin.     Endo - DM 2/2 steroids   - hypoglycemia protocol and sliding scale    GI/Bowel Mgmt -  Continent, moving bowel  well, not on meds; simethicone prn, tums prn     /Bladder Mgmt - Continent    FEN   - Diet - Regular + Thins  (lactose free) [facility]    Precautions / PROPHYLAXIS:   - Falls  - ortho: Weight bearing status: WBAT   - Lungs: Aspiration, Incentive Spirometer    - Pressure injury/Skin: Turn Q2hrs while in bed, OOB to Chair, PT/OT    - DVT: Lovenox, SCDs    Dispo: DOMINIK 8/15 to home with home therapies. 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 He was recently hospitalized in May 2018 2/2 radiation related myelopathy which initially responded well to steroids (requiring a long taper) and went to [facility] for Acute rehab.  He presented again on 7/24 for worsening weakness R>L lower extremity and decreased functional ability at home as well as low back pain. Weakness attributed to likely radiation treatment. Patient had imaging and showed no evidence of cord compression. Patient initiated back on steroids, patient wants to continue long term treatment with steroids and declined taper at [facility] but was discharged on taper.     Subjective:   Patient seen and examined in PT gym. Denies pain, no complaints. Expressed wanting to get as much therapy as he can, even if his injection was delayed a few days.       [X] Constitutional WNL        [X] Cardio WNL              [X] Resp WNL  [X] GI WNL                        [X]  WNL                   [  ] Heme WNL  [X] Endo WNL                   [X] Skin WNL                  [  ] MSK WNL  [  ] Neuro WNL                 [X] Cognitive WNL          [X] Psych WNL    Physical Exam:  Vital Signs Last 24 Hrs  T(C): 36.6 (08 Aug 2018 07:00), Max: 36.9 (07 Aug 2018 20:11)  T(F): 97.8 (08 Aug 2018 07:00), Max: 98.4 (07 Aug 2018 20:11)  HR: 80 (08 Aug 2018 07:00) (80 - 86)  BP: 137/73 (08 Aug 2018 07:00) (122/76 - 148/87)  BP(mean): --  RR: 15 (08 Aug 2018 07:00) (14 - 15)  SpO2: 95% (08 Aug 2018 07:00) (95% - 97%)    Constitutional - NAD, Comfortable  Chest - CTAB  Cardiovascular - RRR, S1S2, no m/r/g  Abdomen - BS+, Soft, NTND  Extremities - No C/C/E, No calf tenderness   Neurologic Exam -                    Cognitive - Awake, Alert, AAO to self, place, date, year, situation     Communication - Fluent, No dysarthria     Motor - No focal deficits                    LEFT    UE - ShAB 5/5, EF 5/5, EE 5/5, WE 5/5,  5/5                    RIGHT UE - ShAB 5/5, EF 5/5, EE 5/5, WE 5/5,  5/5                    LEFT    LE - HF 5/5, KE 5/5, DF 5/5, PF 5/5                    RIGHT LE - HF 1/5, KE 4+/5, DF 3/5, PF 3/5        Sensory - Intact to LT     Reflexes - 2+ b/l patellar, symmetric  Psychiatric - Mood stable, Affect WNL    FUNCTIONAL STATUS  - Bed Mobility: Supervision with rolling, bridging    - Transfers: Min A  - Gait: 75ft RW CG/min A  - ADLs: Mod A - Sup; Transfers Min A    MEDICATIONS  (STANDING):  amLODIPine   Tablet 5 milliGRAM(s) Oral daily  atorvastatin 40 milliGRAM(s) Oral at bedtime  baclofen 15 milliGRAM(s) Oral every 12 hours  dexamethasone     Tablet 6 milliGRAM(s) Oral with breakfast  dexamethasone     Tablet 4 milliGRAM(s) Oral with dinner  dextrose 5%. 1000 milliLiter(s) (50 mL/Hr) IV Continuous <Continuous>  dextrose 50% Injectable 12.5 Gram(s) IV Push once  dextrose 50% Injectable 25 Gram(s) IV Push once  dextrose 50% Injectable 25 Gram(s) IV Push once  enoxaparin Injectable 30 milliGRAM(s) SubCutaneous every 12 hours  gabapentin 600 milliGRAM(s) Oral two times a day  insulin lispro (HumaLOG) corrective regimen sliding scale   SubCutaneous three times a day before meals  insulin lispro (HumaLOG) corrective regimen sliding scale   SubCutaneous at bedtime  losartan 25 milliGRAM(s) Oral daily  multivitamin 1 Tablet(s) Oral daily  pantoprazole    Tablet 40 milliGRAM(s) Oral before breakfast  trimethoprim  160 mG/sulfamethoxazole 800 mG 1 Tablet(s) Oral <User Schedule>    MEDICATIONS  (PRN):  ALPRAZolam 0.25 milliGRAM(s) Oral three times a day PRN anxiety  calcium carbonate    500 mG (Tums) Chewable 1 Tablet(s) Chew every 6 hours PRN Indigestion  dextrose 40% Gel 15 Gram(s) Oral once PRN Blood Glucose LESS THAN 70 milliGRAM(s)/deciliter  glucagon  Injectable 1 milliGRAM(s) IntraMuscular once PRN Glucose LESS THAN 70 milligrams/deciliter  simethicone 80 milliGRAM(s) Chew two times a day PRN Gas    RECENT LABS/IMAGING                        12.1   12.5  )-----------( 169      ( 07 Aug 2018 05:05 )             37.5     08-07    136  |  102  |  21  ----------------------------<  101<H>  4.8   |  28  |  0.71    Ca    8.5      07 Aug 2018 05:05    TPro  5.6<L>  /  Alb  3.1<L>  /  TBili  0.3  /  DBili  x   /  AST  50<H>  /  ALT  142<H>  /  AlkPhos  71  08-07    ASSESSMENT/PLAN  SAM WALTER is a 57 year-old Man with a 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    COMORBIDITIES/ACTIVE MEDICAL ISSUES     Rehab: Gait Instability, ADL impairments and Functional impairments: start Comprehensive Rehab Program of PT/OT   - R AFO for foot drop    Neuro/Myelopathy 2/2 Radiation - PT, Bowel and bladder no issues.   - Spasticity: c/w baclofen  - Pain: Neuropathic - gabapentin 600mg BID  - Anxiety: Xanax prn    Hem-Onc - metastatic NET  - currently on somatostatin (q4 weeks). Have spoken with RN of Dr. Ayala, Oncologist, ( [phone]) and Somatostatin inject can be delayed by 1 week ( 8/20 or 8/21).    - decadron taper; c/w bactrim ppx while on decadron    Cardio - HTN/HLD - c/w home meds for bp control, pt takes crestor at home and decline atorvastatin.     Endo - DM 2/2 steroids   - hypoglycemia protocol and sliding scale    GI/Bowel Mgmt -  Continent, moving bowel  well, not on meds; simethicone prn, tums prn     /Bladder Mgmt - Continent    FEN   - Diet - Regular + Thins  (lactose free) [facility]    Precautions / PROPHYLAXIS:   - Falls  - ortho: Weight bearing status: WBAT   - Lungs: Aspiration, Incentive Spirometer    - Pressure injury/Skin: Turn Q2hrs while in bed, OOB to Chair, PT/OT    - DVT: Lovenox, SCDs    Dispo: DOMINIK 8/15 to home with home therapies. Plan to receive Somatostain injection as scheduling per Dr. Hancock after discharge ( after 8/16/18)

## 2018-08-10 PROCEDURE — 99232 SBSQ HOSP IP/OBS MODERATE 35: CPT

## 2018-08-10 PROCEDURE — 99233 SBSQ HOSP IP/OBS HIGH 50: CPT

## 2018-08-10 RX ORDER — ACETAMINOPHEN 500 MG
650 TABLET ORAL EVERY 6 HOURS
Qty: 0 | Refills: 0 | Status: DISCONTINUED | OUTPATIENT
Start: 2018-08-10 | End: 2018-08-16

## 2018-08-10 RX ORDER — ACETAMINOPHEN 500 MG
650 TABLET ORAL EVERY 6 HOURS
Qty: 0 | Refills: 0 | Status: DISCONTINUED | OUTPATIENT
Start: 2018-08-10 | End: 2018-08-10

## 2018-08-10 RX ORDER — DEXAMETHASONE 0.5 MG/5ML
1 ELIXIR ORAL ONCE
Qty: 0 | Refills: 0 | Status: COMPLETED | OUTPATIENT
Start: 2018-08-10 | End: 2018-08-10

## 2018-08-10 RX ORDER — OXYCODONE HYDROCHLORIDE 5 MG/1
5 TABLET ORAL EVERY 4 HOURS
Qty: 0 | Refills: 0 | Status: DISCONTINUED | OUTPATIENT
Start: 2018-08-10 | End: 2018-08-16

## 2018-08-10 RX ADMIN — Medication 4 MILLIGRAM(S): at 18:10

## 2018-08-10 RX ADMIN — Medication 20 MILLIGRAM(S): at 17:50

## 2018-08-10 RX ADMIN — OXYCODONE HYDROCHLORIDE 5 MILLIGRAM(S): 5 TABLET ORAL at 00:45

## 2018-08-10 RX ADMIN — OXYCODONE HYDROCHLORIDE 5 MILLIGRAM(S): 5 TABLET ORAL at 02:25

## 2018-08-10 RX ADMIN — Medication 1 TABLET(S): at 12:26

## 2018-08-10 RX ADMIN — GABAPENTIN 600 MILLIGRAM(S): 400 CAPSULE ORAL at 17:49

## 2018-08-10 RX ADMIN — Medication 1 MILLIGRAM(S): at 21:43

## 2018-08-10 RX ADMIN — Medication 6 MILLIGRAM(S): at 07:42

## 2018-08-10 RX ADMIN — AMLODIPINE BESYLATE 5 MILLIGRAM(S): 2.5 TABLET ORAL at 06:16

## 2018-08-10 RX ADMIN — GABAPENTIN 600 MILLIGRAM(S): 400 CAPSULE ORAL at 06:18

## 2018-08-10 RX ADMIN — Medication 1 TABLET(S): at 07:42

## 2018-08-10 RX ADMIN — PANTOPRAZOLE SODIUM 40 MILLIGRAM(S): 20 TABLET, DELAYED RELEASE ORAL at 06:18

## 2018-08-10 RX ADMIN — LOSARTAN POTASSIUM 25 MILLIGRAM(S): 100 TABLET, FILM COATED ORAL at 06:18

## 2018-08-10 RX ADMIN — Medication 20 MILLIGRAM(S): at 06:17

## 2018-08-10 NOTE — CONSULT NOTE ADULT - NEUROLOGICAL DETAILS
alert and oriented x 3
strength decreased/right sided rle/responds to verbal commands/alert and oriented x 3

## 2018-08-10 NOTE — CONSULT NOTE ADULT - ASSESSMENT
58 yo m admitted 8/6/18 pw right lower extremity weakness  1.	neuroendocrine tumor w mets to c-spine now presenting with worsening right lower extremity weakness with functional impairments and adl impairments slow taper of steroids  2.	myelopathy cw pt and ot   3.	htn: stable cw current meds  4.	neuropathy cw gabapentin  5.	probable hyperglycemia secondary to chronic steroid use ck fs and give corrective insulin  6.	transaminitis monitor   7.	dvt ppx: lovenox
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

## 2018-08-10 NOTE — CONSULT NOTE ADULT - CONSULT REASON
Neuro-endocrine mets
medical management
Neurendocrine tumor mets   SBO hx   Essential htn   HPL   Corticosteroid myopathy

## 2018-08-10 NOTE — PROGRESS NOTE ADULT - SUBJECTIVE AND OBJECTIVE BOX
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 He was recently hospitalized in May 2018 2/2 radiation related myelopathy which initially responded well to steroids (requiring a long taper) and went to [facility] for Acute rehab.  He presented again on 7/24 for worsening weakness R>L lower extremity and decreased functional ability at home as well as low back pain. Weakness attributed to likely radiation treatment. Patient had imaging and showed no evidence of cord compression. Patient initiated back on steroids, patient wants to continue long term treatment with steroids and declined taper at [facility] but was discharged on taper.     Subjective:   Patient seen and examined at bedside. Reports he had pain in his right knee after therapies yesterday, resolved with pain medication. This morning PT reports erythema on bilateral heels. Patient has no other complaints.     [X] Constitutional WNL        [X] Cardio WNL              [X] Resp WNL  [X] GI WNL                        [X]  WNL                   [  ] Heme WNL  [X] Endo WNL                   [X] Skin WNL                  [  ] MSK WNL  [  ] Neuro WNL                 [X] Cognitive WNL          [X] Psych WNL    Physical Exam:  Vital Signs Last 24 Hrs  T(C): 36.7 (10 Aug 2018 07:45), Max: 36.7 (10 Aug 2018 07:45)  T(F): 98 (10 Aug 2018 07:45), Max: 98 (10 Aug 2018 07:45)  HR: 68 (10 Aug 2018 07:45) (68 - 79)  BP: 134/81 (10 Aug 2018 07:45) (113/66 - 134/81)  BP(mean): --  RR: 14 (10 Aug 2018 07:45) (14 - 14)  SpO2: 95% (10 Aug 2018 07:45) (93% - 95%)    Constitutional - NAD, Comfortable  Chest - CTAB  Cardiovascular - RRR, S1S2, no m/r/g  Abdomen - BS+, Soft, NTND  Extremities - No C/C/E, No calf tenderness   Neurologic Exam -                    Cognitive - Awake, Alert, AAO to self, place, date, year, situation     Communication - Fluent, No dysarthria     Motor - No focal deficits                    LEFT    UE - ShAB 5/5, EF 5/5, EE 5/5, WE 5/5,  5/5                    RIGHT UE - ShAB 5/5, EF 5/5, EE 5/5, WE 5/5,  5/5                    LEFT    LE - HF 5/5, KE 5/5, DF 5/5, PF 5/5                    RIGHT LE - HF 1/5, KE 4+/5, DF 3/5, PF 3/5        Sensory - Intact to LT     Reflexes - 2+ b/l patellar, symmetric  Psychiatric - Mood stable, Affect WNL    FUNCTIONAL STATUS  - Bed Mobility: Supervision with rolling, bridging    - Transfers: Min A  - Gait: 75ft RW CG/min A  - ADLs: Mod A - Sup; Transfers Min A    MEDICATIONS  (STANDING):  amLODIPine   Tablet 5 milliGRAM(s) Oral daily  atorvastatin 40 milliGRAM(s) Oral at bedtime  baclofen 20 milliGRAM(s) Oral every 12 hours  dexamethasone     Tablet 6 milliGRAM(s) Oral with breakfast  dexamethasone     Tablet 4 milliGRAM(s) Oral with dinner  dextrose 5%. 1000 milliLiter(s) (50 mL/Hr) IV Continuous <Continuous>  dextrose 50% Injectable 12.5 Gram(s) IV Push once  dextrose 50% Injectable 25 Gram(s) IV Push once  dextrose 50% Injectable 25 Gram(s) IV Push once  enoxaparin Injectable 30 milliGRAM(s) SubCutaneous every 12 hours  gabapentin 600 milliGRAM(s) Oral two times a day  insulin lispro (HumaLOG) corrective regimen sliding scale   SubCutaneous three times a day before meals  insulin lispro (HumaLOG) corrective regimen sliding scale   SubCutaneous at bedtime  losartan 25 milliGRAM(s) Oral daily  multivitamin 1 Tablet(s) Oral daily  pantoprazole    Tablet 40 milliGRAM(s) Oral before breakfast  trimethoprim  160 mG/sulfamethoxazole 800 mG 1 Tablet(s) Oral <User Schedule>    MEDICATIONS  (PRN):  acetaminophen   Tablet. 650 milliGRAM(s) Oral every 6 hours PRN Mild Pain (1 - 3)  ALPRAZolam 0.25 milliGRAM(s) Oral three times a day PRN anxiety  calcium carbonate    500 mG (Tums) Chewable 1 Tablet(s) Chew every 6 hours PRN Indigestion  dextrose 40% Gel 15 Gram(s) Oral once PRN Blood Glucose LESS THAN 70 milliGRAM(s)/deciliter  glucagon  Injectable 1 milliGRAM(s) IntraMuscular once PRN Glucose LESS THAN 70 milligrams/deciliter  oxyCODONE    IR 5 milliGRAM(s) Oral every 4 hours PRN Moderate Pain (4 - 6)  simethicone 80 milliGRAM(s) Chew two times a day PRN Gas    RECENT LABS/IMAGING                  12.1   12.5  )-----------( 169      ( 07 Aug 2018 05:05 )             37.5     08-07    136  |  102  |  21  ----------------------------<  101<H>  4.8   |  28  |  0.71    Ca    8.5      07 Aug 2018 05:05    TPro  5.6<L>  /  Alb  3.1<L>  /  TBili  0.3  /  DBili  x   /  AST  50<H>  /  ALT  142<H>  /  AlkPhos  71  08-07    ASSESSMENT/PLAN  SAM WALTER is a 57 year-old Man with a 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    COMORBIDITIES/ACTIVE MEDICAL ISSUES     Rehab: Gait Instability, ADL impairments and Functional impairments: continue Comprehensive Rehab Program of PT/OT   - R AFO for foot drop    Neuro/Myelopathy 2/2 Radiation - PT, Bowel and bladder no issues.   - Spasticity: c/w baclofen  - Pain: Neuropathic - gabapentin 600mg BID  - Anxiety: Xanax prn    Hem-Onc - metastatic NET  - currently on somatostatin (q4 weeks). Have spoken with RN of Dr. Ayala, Oncologist, ( [phone]) and Somatostatin inject can be delayed by 1 week ( 8/20 or 8/21).    - decadron taper; c/w bactrim ppx while on decadron    Cardio - HTN/HLD - c/w home meds for bp control, pt takes crestor at home and decline atorvastatin.     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 Recommended patient get new sneakers. Podiatry consult.     FEN   - Diet - Regular + Thins  (lactose free) recd CCHO diet but patient refused    Precautions / PROPHYLAXIS:   - Falls  - ortho: Weight bearing status: WBAT   - Lungs: Aspiration, Incentive Spirometer    - Pressure injury/Skin: Turn Q2hrs while in bed, OOB to Chair, PT/OT    - DVT: Lovenox, SCDs    Dispo: [facility] 8/15 to home with home therapies. Plan to receive Somatostain injection as scheduling per Dr. Hancock after discharge ( after 8/16/18)

## 2018-08-10 NOTE — CONSULT NOTE ADULT - SUBJECTIVE AND OBJECTIVE BOX
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 He was recently hospitalized in May 2018 2/2 radiation related myelopathy which initially responded well to steroids (requiring a long taper) and went to [facility] for Acute rehab.  He presented again on 7/24 for worsening weakness R>L lower extremity and decreased functional ability at home as well as low back pain. Weakness attributed to likely radiation treatment. Patient had imaging and showed no evidence of cord compression. Patient initiated back on steroids, patient wants to continue long term treatment with steroids and [place] [facility] [place] on taper.       Any Major Surgery within past 100 days? No   Two or > Falls within past one year?  Yes                   One Fall with injury past one year? No     FUNCTIONAL HISTORY  Lives alone in a home, 1 step to enter, can live on 1st level.   Prior Level of Function:  Independent prior with Ambulation and ADLs prior to November 2017 and had progressive weakness. Recently admitted to Acute Rehab at [place] a few months prior.     CURRENT FUNCTIONAL STATUS  - Bed Mobility: Supervision with rolling, bridging    - Transfers: Min A   - Gait: 30 feet with CG x 2 using RW   - ADLs: eval (06 Aug 2018 20:59)    PAST MEDICAL & SURGICAL HISTORY:  Small bowel obstruction  Myelopathy due to another disorder  Metastatic cancer  Essential hypertension  Spasticity  Anxiety  Steroid-induced hyperglycemia  Endocrine tumor  S/P small bowel resection    SOCIAL HISTORY:  Tobacco Usage:  (   ) never smoked   (   ) former smoker   (   ) current smoker  (     ) pack years    Tobacco Quit Date:  Substance Use (Street drugs): (  ) never used  (  ) other:  Alcohol Usage:    Family history reviewed and otherwise non-contributory    ALLERGIES:  adhesives (Rash)  lactose (Flatulence)  No Known Drug Allergies  seasonal (Rhinorrhea)    MEDICATIONS  (STANDING):  amLODIPine   Tablet 5 milliGRAM(s) Oral daily  atorvastatin 40 milliGRAM(s) Oral at bedtime  baclofen 15 milliGRAM(s) Oral every 12 hours  dexamethasone     Tablet 6 milliGRAM(s) Oral <User Schedule>  enoxaparin Injectable 30 milliGRAM(s) SubCutaneous every 12 hours  gabapentin 600 milliGRAM(s) Oral two times a day  losartan 25 milliGRAM(s) Oral daily  multivitamin 1 Tablet(s) Oral daily  pantoprazole    Tablet 40 milliGRAM(s) Oral before breakfast  trimethoprim  160 mG/sulfamethoxazole 800 mG 1 Tablet(s) Oral <User Schedule>    MEDICATIONS  (PRN):  ALPRAZolam 0.25 milliGRAM(s) Oral three times a day PRN anxiety  calcium carbonate    500 mG (Tums) Chewable 1 Tablet(s) Chew every 6 hours PRN Indigestion  simethicone 80 milliGRAM(s) Chew two times a day PRN Gas    Review of Systems: Refer to HPI for pertinent positives and negatives. All other ROS reviewed and negative except as otherwise stated above.    Vital Signs Last 24 Hrs  T(F): 97.9 (07 Aug 2018 06:19), Max: 98.7 (06 Aug 2018 20:01)  HR: 68 (07 Aug 2018 06:19) (68 - 90)  BP: 135/79 (07 Aug 2018 06:19) (135/79 - 136/87)  RR: 14 (07 Aug 2018 06:19) (14 - 14)  SpO2: 97% (07 Aug 2018 06:19) (96% - 97%)  I&O's Summary    PHYSICAL EXAM:  GENERAL: NAD, well-groomed, well-developed  HEAD:  Atraumatic, Normocephalic  EYES: EOMI, PERRLA, conjunctiva and sclera clear  ENMT: Moist mucous membranes, Good dentition  NECK: Supple, No JVD  CHEST/LUNG: Clear to auscultation bilaterally; No rales, rhonchi, wheezing, or rubs  HEART: Regular rate and rhythm; S1/S2, No murmurs, rubs, or gallops  ABDOMEN: Soft, Nontender, Nondistended; Bowel sounds present  VASCULAR: Normal pulses, Normal capillary refill  EXTREMITIES: No cyanosis, No edema, moves all 4 extremities  LYMPH: No lymphadenopathy noted  SKIN: Warm, Intact  PSYCH: Normal mood and affect  NERVOUS SYSTEM:  A/O x3, Good concentration; right lower  extremity   weakness 1-2/5                    12.1   12.5  )-----------( 169      ( 07 Aug 2018 05:05 )             37.5     08-07    136  |  102  |  21  ----------------------------<  101  4.8   |  28  |  0.71    Ca    8.5      07 Aug 2018 05:05    TPro  5.6  /  Alb  3.1  /  TBili  0.3  /  DBili  x   /  AST  50  /  ALT  142  /  AlkPhos  71  08-07    eGFR if Non African American: 103 mL/min/1.73M2 (08-07-18 @ 05:05)  eGFR if African American: 119 mL/min/1.73M2 (08-07-18 @ 05:05)                  CAPILLARY BLOOD GLUCOSE              RADIOLOGY & ADDITIONAL TESTS:    Care Discussed with Consultants/Other Providers:
seen in the rehab setting     had right knee pain that has resolved with pain meds   occurred overnight   thought to be related to the work out per patient reports     no swelling   no chest pain no sob     ros all others are negative     ICU Vital Signs Last 24 Hrs  T(C): 36.7 (10 Aug 2018 07:45), Max: 36.7 (10 Aug 2018 07:45)  T(F): 98 (10 Aug 2018 07:45), Max: 98 (10 Aug 2018 07:45)  HR: 68 (10 Aug 2018 07:45) (68 - 79)  BP: 134/81 (10 Aug 2018 07:45) (113/66 - 134/81)  BP(mean): --  ABP: --  ABP(mean): --  RR: 14 (10 Aug 2018 07:45) (14 - 14)  SpO2: 95% (10 Aug 2018 07:45) (93% - 95%)      MEDICATIONS  (STANDING):  amLODIPine   Tablet 5 milliGRAM(s) Oral daily  atorvastatin 40 milliGRAM(s) Oral at bedtime  baclofen 20 milliGRAM(s) Oral every 12 hours  dexamethasone     Tablet 6 milliGRAM(s) Oral with breakfast  dexamethasone     Tablet 4 milliGRAM(s) Oral with dinner  dextrose 5%. 1000 milliLiter(s) (50 mL/Hr) IV Continuous <Continuous>  dextrose 50% Injectable 12.5 Gram(s) IV Push once  dextrose 50% Injectable 25 Gram(s) IV Push once  dextrose 50% Injectable 25 Gram(s) IV Push once  enoxaparin Injectable 30 milliGRAM(s) SubCutaneous every 12 hours  gabapentin 600 milliGRAM(s) Oral two times a day  insulin lispro (HumaLOG) corrective regimen sliding scale   SubCutaneous three times a day before meals  insulin lispro (HumaLOG) corrective regimen sliding scale   SubCutaneous at bedtime  losartan 25 milliGRAM(s) Oral daily  multivitamin 1 Tablet(s) Oral daily  pantoprazole    Tablet 40 milliGRAM(s) Oral before breakfast  trimethoprim  160 mG/sulfamethoxazole 800 mG 1 Tablet(s) Oral <User Schedule>    MEDICATIONS  (PRN):  acetaminophen   Tablet. 650 milliGRAM(s) Oral every 6 hours PRN Mild Pain (1 - 3)  ALPRAZolam 0.25 milliGRAM(s) Oral three times a day PRN anxiety  calcium carbonate    500 mG (Tums) Chewable 1 Tablet(s) Chew every 6 hours PRN Indigestion  dextrose 40% Gel 15 Gram(s) Oral once PRN Blood Glucose LESS THAN 70 milliGRAM(s)/deciliter  glucagon  Injectable 1 milliGRAM(s) IntraMuscular once PRN Glucose LESS THAN 70 milligrams/deciliter  oxyCODONE    IR 5 milliGRAM(s) Oral every 4 hours PRN Moderate Pain (4 - 6)  simethicone 80 milliGRAM(s) Chew two times a day PRN Gas
Good spirits       seen post rehab in acute rehab setting     watching baseball history channel     no co     he is realistic about recovery strength, takes it day by day per our conversation   has good insight into his illness treatment plan     ros all others are negative     Vital Signs Last 24 Hrs  T(C): 36.5 (09 Aug 2018 08:48), Max: 36.8 (08 Aug 2018 20:15)  T(F): 97.7 (09 Aug 2018 08:48), Max: 98.3 (08 Aug 2018 20:15)  HR: 79 (09 Aug 2018 08:48) (79 - 86)  BP: 136/86 (09 Aug 2018 08:48) (108/70 - 136/86)  BP(mean): --  RR: 14 (09 Aug 2018 08:48) (14 - 14)  SpO2: 99% (09 Aug 2018 08:48) (96% - 99%)    MEDICATIONS  (STANDING):  amLODIPine   Tablet 5 milliGRAM(s) Oral daily  atorvastatin 40 milliGRAM(s) Oral at bedtime  baclofen 20 milliGRAM(s) Oral every 12 hours  dexamethasone     Tablet 6 milliGRAM(s) Oral with breakfast  dexamethasone     Tablet 4 milliGRAM(s) Oral with dinner  dextrose 5%. 1000 milliLiter(s) (50 mL/Hr) IV Continuous <Continuous>  dextrose 50% Injectable 12.5 Gram(s) IV Push once  dextrose 50% Injectable 25 Gram(s) IV Push once  dextrose 50% Injectable 25 Gram(s) IV Push once  enoxaparin Injectable 30 milliGRAM(s) SubCutaneous every 12 hours  gabapentin 600 milliGRAM(s) Oral two times a day  insulin lispro (HumaLOG) corrective regimen sliding scale   SubCutaneous three times a day before meals  insulin lispro (HumaLOG) corrective regimen sliding scale   SubCutaneous at bedtime  losartan 25 milliGRAM(s) Oral daily  multivitamin 1 Tablet(s) Oral daily  pantoprazole    Tablet 40 milliGRAM(s) Oral before breakfast  trimethoprim  160 mG/sulfamethoxazole 800 mG 1 Tablet(s) Oral <User Schedule>    MEDICATIONS  (PRN):  ALPRAZolam 0.25 milliGRAM(s) Oral three times a day PRN anxiety  calcium carbonate    500 mG (Tums) Chewable 1 Tablet(s) Chew every 6 hours PRN Indigestion  dextrose 40% Gel 15 Gram(s) Oral once PRN Blood Glucose LESS THAN 70 milliGRAM(s)/deciliter  glucagon  Injectable 1 milliGRAM(s) IntraMuscular once PRN Glucose LESS THAN 70 milligrams/deciliter  simethicone 80 milliGRAM(s) Chew two times a day PRN Gas

## 2018-08-11 PROCEDURE — 99232 SBSQ HOSP IP/OBS MODERATE 35: CPT

## 2018-08-11 RX ORDER — DEXAMETHASONE 0.5 MG/5ML
5 ELIXIR ORAL
Qty: 0 | Refills: 0 | Status: COMPLETED | OUTPATIENT
Start: 2018-08-11 | End: 2018-08-12

## 2018-08-11 RX ADMIN — Medication 5 MILLIGRAM(S): at 18:20

## 2018-08-11 RX ADMIN — Medication 20 MILLIGRAM(S): at 18:20

## 2018-08-11 RX ADMIN — LOSARTAN POTASSIUM 25 MILLIGRAM(S): 100 TABLET, FILM COATED ORAL at 06:27

## 2018-08-11 RX ADMIN — AMLODIPINE BESYLATE 5 MILLIGRAM(S): 2.5 TABLET ORAL at 06:27

## 2018-08-11 RX ADMIN — Medication 0.25 MILLIGRAM(S): at 00:27

## 2018-08-11 RX ADMIN — GABAPENTIN 600 MILLIGRAM(S): 400 CAPSULE ORAL at 06:27

## 2018-08-11 RX ADMIN — Medication 20 MILLIGRAM(S): at 06:27

## 2018-08-11 RX ADMIN — GABAPENTIN 600 MILLIGRAM(S): 400 CAPSULE ORAL at 18:20

## 2018-08-11 RX ADMIN — PANTOPRAZOLE SODIUM 40 MILLIGRAM(S): 20 TABLET, DELAYED RELEASE ORAL at 06:27

## 2018-08-11 RX ADMIN — Medication 6 MILLIGRAM(S): at 07:35

## 2018-08-11 NOTE — DIETITIAN INITIAL EVALUATION ADULT. - ENERGY NEEDS
Ht: 68 inches UBW: 166 pounds BMI: 25.23 kg/m2 IBW: 154 (+/-10%) 107.8%IBW  Pertinent information: Pt 58 y/o M with PMH: small bowel obstruction, HTN, anxiety, endocrine tumor, metastatic cancer, myelopathy, hyperglycemia due to steroids, admitted with lower extremity weakness, ADL and functional impairments.   No noted edema as per flow sheets. Skin: no noted pressure injuries as per documentation.

## 2018-08-11 NOTE — DIETITIAN INITIAL EVALUATION ADULT. - ADHERENCE
Pt reports not following any type of diet or restriction at home. Reports taking MVI PTA. Noted pt taking steroids as per chart, pt reports controlled BG, denies taking insulin. Noted HbA1c as per chart (08/08) 4.7%. Pt reports having high cholesterol, states "it is controlled by medications."/n/a

## 2018-08-11 NOTE — DIETITIAN INITIAL EVALUATION ADULT. - OTHER INFO
Pt seen for length of stay initial assessment. Pt reports good appetite and PO intake. Noted 100% PO intake as per flow sheets and breakfast tray at bedside. Denies difficulty chewing/swallowing. Pt denies nausea, vomiting, diarrhea, or constipation, reports last BM today (08/11). Denies weight changes PTA, reports UBW between 166 - 169 pounds. Current weight as per flow sheets (08/06) 192.6 pounds -?accuracy of weight fluctuations likely due to scale error, recommend new weight.

## 2018-08-11 NOTE — DIETITIAN INITIAL EVALUATION ADULT. - NS AS NUTRI INTERV ED CONTENT
Reviewed education on low saturated fat diet to help lower cholesterol. Stressed the importance of limited fried foods and saturated fat consumption. Discussed foods high in saturated fats. Reviewed recommended foods within therapeutic diet. Noted pt knowledgeable about low fat diet. Briefly reviewed education on low saturated fat diet to help lower cholesterol. Stressed the importance of limited fried foods and saturated fat consumption. Discussed foods high in saturated fats. Reviewed recommended foods within therapeutic diet. Noted pt knowledgeable about low fat diet.

## 2018-08-11 NOTE — PROGRESS NOTE ADULT - SUBJECTIVE AND OBJECTIVE BOX
Cc: Gait dysfunction    HPI: Patient with no new medical issues today.  Pain controlled, no chest pain, no N/V, no Fevers/Chills. No other new ROS  Has been tolerating rehabilitation program.  Patient was upset last night regarding steroid taper- increased to 11 mg per day.     acetaminophen   Tablet. 650 milliGRAM(s) Oral every 6 hours PRN  ALPRAZolam 0.25 milliGRAM(s) Oral three times a day PRN  amLODIPine   Tablet 5 milliGRAM(s) Oral daily  atorvastatin 40 milliGRAM(s) Oral at bedtime  baclofen 20 milliGRAM(s) Oral every 12 hours  calcium carbonate    500 mG (Tums) Chewable 1 Tablet(s) Chew every 6 hours PRN  dexamethasone     Tablet 5 milliGRAM(s) Oral with dinner  dexamethasone     Tablet 6 milliGRAM(s) Oral with breakfast  dexamethasone     Tablet 4 milliGRAM(s) Oral with dinner  dextrose 40% Gel 15 Gram(s) Oral once PRN  dextrose 5%. 1000 milliLiter(s) IV Continuous <Continuous>  dextrose 50% Injectable 12.5 Gram(s) IV Push once  dextrose 50% Injectable 25 Gram(s) IV Push once  dextrose 50% Injectable 25 Gram(s) IV Push once  enoxaparin Injectable 30 milliGRAM(s) SubCutaneous every 12 hours  gabapentin 600 milliGRAM(s) Oral two times a day  glucagon  Injectable 1 milliGRAM(s) IntraMuscular once PRN  insulin lispro (HumaLOG) corrective regimen sliding scale   SubCutaneous three times a day before meals  insulin lispro (HumaLOG) corrective regimen sliding scale   SubCutaneous at bedtime  losartan 25 milliGRAM(s) Oral daily  multivitamin 1 Tablet(s) Oral daily  oxyCODONE    IR 5 milliGRAM(s) Oral every 4 hours PRN  pantoprazole    Tablet 40 milliGRAM(s) Oral before breakfast  simethicone 80 milliGRAM(s) Chew two times a day PRN  trimethoprim  160 mG/sulfamethoxazole 800 mG 1 Tablet(s) Oral <User Schedule>      T(C): 36.6 (08-11-18 @ 07:37), Max: 37.1 (08-10-18 @ 22:06)  HR: 68 (08-11-18 @ 07:37) (66 - 68)  BP: 136/70 (08-11-18 @ 07:37) (135/79 - 144/71)  RR: 14 (08-11-18 @ 07:37) (14 - 14)  SpO2: 96% (08-11-18 @ 07:37) (96% - 96%)    In NAD  HEENT- EOMI  Heart- RRR, S1S2  Lungs- CTA bl.  Ext- No calf pain  Neuro- Exam unchanged    Imp: Patient with diagnosis of  cervical myelopathy admitted for comprehensive acute rehabilitation.    Plan:  - Continue PT/OT/SLP  - DVT prophylaxis- lovenox  - Skin- Turn q2h, check skin daily  - CVS- Stable  - Patient is stable to continue current rehabilitation program.

## 2018-08-12 PROCEDURE — 99232 SBSQ HOSP IP/OBS MODERATE 35: CPT

## 2018-08-12 RX ADMIN — GABAPENTIN 600 MILLIGRAM(S): 400 CAPSULE ORAL at 17:28

## 2018-08-12 RX ADMIN — Medication 6 MILLIGRAM(S): at 08:17

## 2018-08-12 RX ADMIN — SIMETHICONE 80 MILLIGRAM(S): 80 TABLET, CHEWABLE ORAL at 17:30

## 2018-08-12 RX ADMIN — Medication 5 MILLIGRAM(S): at 17:27

## 2018-08-12 RX ADMIN — GABAPENTIN 600 MILLIGRAM(S): 400 CAPSULE ORAL at 06:45

## 2018-08-12 RX ADMIN — Medication 20 MILLIGRAM(S): at 17:28

## 2018-08-12 RX ADMIN — LOSARTAN POTASSIUM 25 MILLIGRAM(S): 100 TABLET, FILM COATED ORAL at 06:46

## 2018-08-12 RX ADMIN — ENOXAPARIN SODIUM 30 MILLIGRAM(S): 100 INJECTION SUBCUTANEOUS at 17:29

## 2018-08-12 RX ADMIN — AMLODIPINE BESYLATE 5 MILLIGRAM(S): 2.5 TABLET ORAL at 06:46

## 2018-08-12 RX ADMIN — PANTOPRAZOLE SODIUM 40 MILLIGRAM(S): 20 TABLET, DELAYED RELEASE ORAL at 06:46

## 2018-08-12 RX ADMIN — Medication 20 MILLIGRAM(S): at 06:46

## 2018-08-12 NOTE — PROGRESS NOTE ADULT - SUBJECTIVE AND OBJECTIVE BOX
Cc: Gait dysfunction    HPI: Patient with no new medical issues today.  Pain controlled, no chest pain, no N/V, no Fevers/Chills. No other new ROS  Has been tolerating rehabilitation program.    MEDICATIONS  (STANDING):  amLODIPine   Tablet 5 milliGRAM(s) Oral daily  atorvastatin 40 milliGRAM(s) Oral at bedtime  baclofen 20 milliGRAM(s) Oral every 12 hours  dexamethasone     Tablet 5 milliGRAM(s) Oral with dinner  dexamethasone     Tablet 6 milliGRAM(s) Oral with breakfast  dextrose 5%. 1000 milliLiter(s) (50 mL/Hr) IV Continuous <Continuous>  dextrose 50% Injectable 12.5 Gram(s) IV Push once  dextrose 50% Injectable 25 Gram(s) IV Push once  dextrose 50% Injectable 25 Gram(s) IV Push once  enoxaparin Injectable 30 milliGRAM(s) SubCutaneous every 12 hours  gabapentin 600 milliGRAM(s) Oral two times a day  losartan 25 milliGRAM(s) Oral daily  multivitamin 1 Tablet(s) Oral daily  pantoprazole    Tablet 40 milliGRAM(s) Oral before breakfast  trimethoprim  160 mG/sulfamethoxazole 800 mG 1 Tablet(s) Oral <User Schedule>    MEDICATIONS  (PRN):  acetaminophen   Tablet. 650 milliGRAM(s) Oral every 6 hours PRN Mild Pain (1 - 3)  ALPRAZolam 0.25 milliGRAM(s) Oral three times a day PRN anxiety  calcium carbonate    500 mG (Tums) Chewable 1 Tablet(s) Chew every 6 hours PRN Indigestion  dextrose 40% Gel 15 Gram(s) Oral once PRN Blood Glucose LESS THAN 70 milliGRAM(s)/deciliter  glucagon  Injectable 1 milliGRAM(s) IntraMuscular once PRN Glucose LESS THAN 70 milligrams/deciliter  oxyCODONE    IR 5 milliGRAM(s) Oral every 4 hours PRN Moderate Pain (4 - 6)  simethicone 80 milliGRAM(s) Chew two times a day PRN Gas    Vital Signs Last 24 Hrs  T(C): 36.7 (12 Aug 2018 06:51), Max: 36.7 (11 Aug 2018 22:52)  T(F): 98.1 (12 Aug 2018 06:51), Max: 98.1 (12 Aug 2018 06:51)  HR: 70 (12 Aug 2018 06:51) (70 - 72)  BP: 145/82 (12 Aug 2018 06:51) (142/81 - 145/82)  BP(mean): --  RR: 14 (12 Aug 2018 06:51) (14 - 14)  SpO2: 97% (12 Aug 2018 06:51) (97% - 98%)    In NAD  HEENT- EOMI  Heart- RRR, S1S2  Lungs- CTA bl.  Ext- No calf pain  Neuro- Exam unchanged    Imp: Patient with diagnosis of  cervical myelopathy admitted for comprehensive acute rehabilitation.    Plan:  - Continue PT/OT/SLP  - DVT prophylaxis- lovenox  - Skin- Turn q2h, check skin daily  - CVS- Stable  - Patient is stable to continue current rehabilitation program.

## 2018-08-13 PROCEDURE — 99233 SBSQ HOSP IP/OBS HIGH 50: CPT | Mod: GC

## 2018-08-13 PROCEDURE — 99232 SBSQ HOSP IP/OBS MODERATE 35: CPT

## 2018-08-13 RX ORDER — DEXAMETHASONE 0.5 MG/5ML
5 ELIXIR ORAL
Qty: 0 | Refills: 0 | Status: COMPLETED | OUTPATIENT
Start: 2018-08-13 | End: 2018-08-16

## 2018-08-13 RX ORDER — ROSUVASTATIN CALCIUM 5 MG/1
10 TABLET ORAL AT BEDTIME
Qty: 0 | Refills: 0 | Status: DISCONTINUED | OUTPATIENT
Start: 2018-08-13 | End: 2018-08-16

## 2018-08-13 RX ADMIN — Medication 20 MILLIGRAM(S): at 05:14

## 2018-08-13 RX ADMIN — PANTOPRAZOLE SODIUM 40 MILLIGRAM(S): 20 TABLET, DELAYED RELEASE ORAL at 05:14

## 2018-08-13 RX ADMIN — GABAPENTIN 600 MILLIGRAM(S): 400 CAPSULE ORAL at 05:13

## 2018-08-13 RX ADMIN — Medication 5 MILLIGRAM(S): at 11:02

## 2018-08-13 RX ADMIN — Medication 5 MILLIGRAM(S): at 17:27

## 2018-08-13 RX ADMIN — Medication 1 TABLET(S): at 08:58

## 2018-08-13 RX ADMIN — AMLODIPINE BESYLATE 5 MILLIGRAM(S): 2.5 TABLET ORAL at 05:14

## 2018-08-13 RX ADMIN — LOSARTAN POTASSIUM 25 MILLIGRAM(S): 100 TABLET, FILM COATED ORAL at 05:14

## 2018-08-13 RX ADMIN — Medication 1 TABLET(S): at 11:00

## 2018-08-13 RX ADMIN — Medication 20 MILLIGRAM(S): at 17:26

## 2018-08-13 RX ADMIN — ROSUVASTATIN CALCIUM 10 MILLIGRAM(S): 5 TABLET ORAL at 21:02

## 2018-08-13 RX ADMIN — ENOXAPARIN SODIUM 30 MILLIGRAM(S): 100 INJECTION SUBCUTANEOUS at 17:27

## 2018-08-13 RX ADMIN — GABAPENTIN 600 MILLIGRAM(S): 400 CAPSULE ORAL at 17:26

## 2018-08-13 NOTE — PROGRESS NOTE ADULT - ASSESSMENT
Pt is a 56yo M is admitted to acute rehab for radiation induced myopathy.     *radiation induced myelopathy  PT/OT  Continue steroid duong  Bactrim TIW for ppx while on steroid  Continue Gabapentin and Baclofen   Pain management     *Neuroendocrine tumor with mets   Continue steroid  Injection at Cancer center this thr  Follow up as out patient     *HLD  Cont lipitor     *HTN  Controlled  Continue current meds     *Anxiety  Xanex prn     *prophylactic measure  DVT ppx Lovenox   GI ppx Protonix  Steroid ppx Bactrim

## 2018-08-13 NOTE — PROGRESS NOTE ADULT - SUBJECTIVE AND OBJECTIVE BOX
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 He was recently hospitalized in May 2018 2/2 radiation related myelopathy which initially responded well to steroids (requiring a long taper) and went to [facility] for Acute rehab.  He presented again on 7/24 for worsening weakness R>L lower extremity and decreased functional ability at home as well as low back pain. Weakness attributed to likely radiation treatment. Patient had imaging and showed no evidence of cord compression. Patient initiated back on steroids, patient wants to continue long term treatment with steroids and declined taper at [facility] [place] but was discharged on taper.     Subjective:   Patient seen and examined at bedside. Reports improved strength of RLE. Continues to express concern over dexamethasone taper.    [X] Constitutional WNL        [X] Cardio WNL              [X] Resp WNL  [X] GI WNL                        [X]  WNL                   [  ] Heme WNL  [X] Endo WNL                   [X] Skin WNL                  [  ] MSK WNL  [  ] Neuro WNL                 [X] Cognitive WNL          [X] Psych WNL    Physical Exam:  Vital Signs Last 24 Hrs  T(C): 36.7 (13 Aug 2018 07:57), Max: 36.7 (13 Aug 2018 05:17)  T(F): 98 (13 Aug 2018 07:57), Max: 98 (13 Aug 2018 05:17)  HR: 66 (13 Aug 2018 07:57) (66 - 82)  BP: 131/81 (13 Aug 2018 07:57) (129/77 - 140/82)  BP(mean): --  RR: 14 (13 Aug 2018 07:57) (14 - 14)  SpO2: 95% (13 Aug 2018 07:57) (95% - 97%)    Constitutional - NAD, Comfortable  Chest - CTAB  Cardiovascular - RRR, S1S2, no m/r/g  Abdomen - BS+, Soft, NTND  Extremities - No C/C/E, No calf tenderness   Neurologic Exam -                    Cognitive - Awake, Alert, AAO to self, place, date, year, situation     Communication - Fluent, No dysarthria     Motor - No focal deficits                    LEFT    UE - ShAB 5/5, EF 5/5, EE 5/5, WE 5/5,  5/5                    RIGHT UE - ShAB 5/5, EF 5/5, EE 5/5, WE 5/5,  5/5                    LEFT    LE - HF 5/5, KE 5/5, DF 5/5, PF 5/5                    RIGHT LE - HF 1/5, KE 4+/5, DF 3/5, PF 3/5        Sensory - Intact to LT     Reflexes - 2+ b/l patellar, symmetric  Psychiatric - Mood stable, Affect WNL    FUNCTIONAL STATUS  - Bed Mobility: Supervision with rolling, bridging    - Transfers: Min A  - Gait: 75ft RW CG/min A  - ADLs: Mod A - Sup; Transfers Min A    MEDICATIONS  (STANDING):  amLODIPine   Tablet 5 milliGRAM(s) Oral daily  atorvastatin 40 milliGRAM(s) Oral at bedtime  baclofen 20 milliGRAM(s) Oral every 12 hours  dexamethasone     Tablet 5 milliGRAM(s) Oral two times a day  dextrose 5%. 1000 milliLiter(s) (50 mL/Hr) IV Continuous <Continuous>  dextrose 50% Injectable 12.5 Gram(s) IV Push once  dextrose 50% Injectable 25 Gram(s) IV Push once  dextrose 50% Injectable 25 Gram(s) IV Push once  enoxaparin Injectable 30 milliGRAM(s) SubCutaneous every 12 hours  gabapentin 600 milliGRAM(s) Oral two times a day  losartan 25 milliGRAM(s) Oral daily  multivitamin 1 Tablet(s) Oral daily  pantoprazole    Tablet 40 milliGRAM(s) Oral before breakfast  trimethoprim  160 mG/sulfamethoxazole 800 mG 1 Tablet(s) Oral <User Schedule>    MEDICATIONS  (PRN):  acetaminophen   Tablet. 650 milliGRAM(s) Oral every 6 hours PRN Mild Pain (1 - 3)  ALPRAZolam 0.25 milliGRAM(s) Oral three times a day PRN anxiety  calcium carbonate    500 mG (Tums) Chewable 1 Tablet(s) Chew every 6 hours PRN Indigestion  dextrose 40% Gel 15 Gram(s) Oral once PRN Blood Glucose LESS THAN 70 milliGRAM(s)/deciliter  glucagon  Injectable 1 milliGRAM(s) IntraMuscular once PRN Glucose LESS THAN 70 milligrams/deciliter  oxyCODONE    IR 5 milliGRAM(s) Oral every 4 hours PRN Moderate Pain (4 - 6)  simethicone 80 milliGRAM(s) Chew two times a day PRN Gas    RECENT LABS/IMAGING           ASSESSMENT/PLAN  SAM WALTER is a 57 year-old Man with a 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    COMORBIDITIES/ACTIVE MEDICAL ISSUES     Rehab: Gait Instability, ADL impairments and Functional impairments: continue Comprehensive Rehab Program of PT/OT   - R AFO for foot drop    Neuro/Myelopathy 2/2 Radiation - PT, Bowel and bladder no issues.   - Spasticity: c/w baclofen  - Pain: Neuropathic - gabapentin 600mg BID  - Anxiety: Xanax prn    Hem-Onc - metastatic NET  - currently on somatostatin (q4 weeks). Have spoken with RN of Dr. Ayala, Oncologist, ( [phone]) and Somatostatin inject can be delayed by 1 week ( 8/20 or 8/21).    - decadron taper; c/w bactrim ppx while on decadron  - Patient refused decadron on Friday evening until he was given an extra 1mg, said he did not want to drop from 12mg daily to 10mg daily. Per chart review, patient had already been tapered to 10mg daily since 8/8. Patient continued to deny taking 10mg total daily dose, insisted on receiving 11mg total. Discussed decadron taper with patient this morning, will decrease to 10mg total daily dose until discharge. Patient can then follow up with oncology for further management.     Cardio - HTN/HLD - c/w home meds for bp control, pt takes crestor at home and decline atorvastatin.     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 Recommended patient get new sneakers. Podiatry consult.     FEN   - Diet - Regular + Thins  (lactose free) recd CCHO diet but patient refused    Precautions / PROPHYLAXIS:   - Falls  - ortho: Weight bearing status: WBAT   - Lungs: Aspiration, Incentive Spirometer    - Pressure injury/Skin: Turn Q2hrs while in bed, OOB to Chair, PT/OT    - DVT: Lovenox, SCDs    Dispo: [facility] 8/15 to home with home therapies. Plan to receive Somatostain injection as scheduling per Dr. Hancock after discharge ( after 8/16/18)

## 2018-08-13 NOTE — PROGRESS NOTE ADULT - SUBJECTIVE AND OBJECTIVE BOX
Pt is a 56yo M is admitted to acute rehab for radiation induced myopathy.   Pt was seen and examined in chair. Pt states his right leg still weaker than left, but is much stronger than before. Pt is excited to be discharged this Wed to home. Hemodynamically stable, BGM controlled    Vital Signs Last 24 Hrs  T(C): 36.7 (13 Aug 2018 07:57), Max: 36.7 (13 Aug 2018 05:17)  T(F): 98 (13 Aug 2018 07:57), Max: 98 (13 Aug 2018 05:17)  HR: 66 (13 Aug 2018 07:57) (66 - 82)  BP: 131/81 (13 Aug 2018 07:57) (129/77 - 140/82)  BP(mean): --  RR: 14 (13 Aug 2018 07:57) (14 - 14)  SpO2: 95% (13 Aug 2018 07:57) (95% - 97%)    I&O's Summary    12 Aug 2018 07:01  -  13 Aug 2018 07:00  --------------------------------------------------------  IN: 500 mL / OUT: 360 mL / NET: 140 mL        CAPILLARY BLOOD GLUCOSE          PHYSICAL EXAM:    Constitutional: NAD, awake  HEENT: PERR, EOMI  Neck: Soft and supple  Respiratory: Breath sounds are clear bilaterally  Cardiovascular: S1 and S2  Gastrointestinal: Bowel Sounds present, soft, nontender  Extremities: No peripheral edema  Vascular: 2+ peripheral pulses  Neurological: A/O x 3    MEDICATIONS:  MEDICATIONS  (STANDING):  amLODIPine   Tablet 5 milliGRAM(s) Oral daily  atorvastatin 40 milliGRAM(s) Oral at bedtime  baclofen 20 milliGRAM(s) Oral every 12 hours  dexamethasone     Tablet 4 milliGRAM(s) Oral with breakfast  dextrose 5%. 1000 milliLiter(s) (50 mL/Hr) IV Continuous <Continuous>  dextrose 50% Injectable 12.5 Gram(s) IV Push once  dextrose 50% Injectable 25 Gram(s) IV Push once  dextrose 50% Injectable 25 Gram(s) IV Push once  enoxaparin Injectable 30 milliGRAM(s) SubCutaneous every 12 hours  gabapentin 600 milliGRAM(s) Oral two times a day  losartan 25 milliGRAM(s) Oral daily  multivitamin 1 Tablet(s) Oral daily  pantoprazole    Tablet 40 milliGRAM(s) Oral before breakfast  trimethoprim  160 mG/sulfamethoxazole 800 mG 1 Tablet(s) Oral <User Schedule>      LABS: All Labs Reviewed:                Blood Culture:     RADIOLOGY/EKG:    DVT PPX:    ADVANCED DIRECTIVE:    DISPOSITION:

## 2018-08-14 LAB
ALBUMIN SERPL ELPH-MCNC: 3.2 G/DL — LOW (ref 3.3–5)
ALP SERPL-CCNC: 74 U/L — SIGNIFICANT CHANGE UP (ref 40–120)
ALT FLD-CCNC: 294 U/L DA — HIGH (ref 10–45)
ANION GAP SERPL CALC-SCNC: 3 MMOL/L — LOW (ref 5–17)
AST SERPL-CCNC: 70 U/L — HIGH (ref 10–40)
BILIRUB SERPL-MCNC: 0.3 MG/DL — SIGNIFICANT CHANGE UP (ref 0.2–1.2)
BUN SERPL-MCNC: 21 MG/DL — SIGNIFICANT CHANGE UP (ref 7–23)
CALCIUM SERPL-MCNC: 8.9 MG/DL — SIGNIFICANT CHANGE UP (ref 8.4–10.5)
CHLORIDE SERPL-SCNC: 97 MMOL/L — SIGNIFICANT CHANGE UP (ref 96–108)
CO2 SERPL-SCNC: 31 MMOL/L — SIGNIFICANT CHANGE UP (ref 22–31)
CREAT SERPL-MCNC: 0.7 MG/DL — SIGNIFICANT CHANGE UP (ref 0.5–1.3)
GLUCOSE SERPL-MCNC: 116 MG/DL — HIGH (ref 70–99)
HCT VFR BLD CALC: 39.7 % — SIGNIFICANT CHANGE UP (ref 39–50)
HGB BLD-MCNC: 12.4 G/DL — LOW (ref 13–17)
MCHC RBC-ENTMCNC: 29.9 PG — SIGNIFICANT CHANGE UP (ref 27–34)
MCHC RBC-ENTMCNC: 31.4 GM/DL — LOW (ref 32–36)
MCV RBC AUTO: 95.2 FL — SIGNIFICANT CHANGE UP (ref 80–100)
PLATELET # BLD AUTO: 126 K/UL — LOW (ref 150–400)
POTASSIUM SERPL-MCNC: 4.5 MMOL/L — SIGNIFICANT CHANGE UP (ref 3.5–5.3)
POTASSIUM SERPL-SCNC: 4.5 MMOL/L — SIGNIFICANT CHANGE UP (ref 3.5–5.3)
PROT SERPL-MCNC: 6 G/DL — SIGNIFICANT CHANGE UP (ref 6–8.3)
RBC # BLD: 4.16 M/UL — LOW (ref 4.2–5.8)
RBC # FLD: 14.7 % — HIGH (ref 10.3–14.5)
SODIUM SERPL-SCNC: 131 MMOL/L — LOW (ref 135–145)
WBC # BLD: 10.4 K/UL — SIGNIFICANT CHANGE UP (ref 3.8–10.5)
WBC # FLD AUTO: 10.4 K/UL — SIGNIFICANT CHANGE UP (ref 3.8–10.5)

## 2018-08-14 PROCEDURE — 99233 SBSQ HOSP IP/OBS HIGH 50: CPT | Mod: GC

## 2018-08-14 PROCEDURE — 93970 EXTREMITY STUDY: CPT | Mod: 26

## 2018-08-14 PROCEDURE — 99232 SBSQ HOSP IP/OBS MODERATE 35: CPT

## 2018-08-14 PROCEDURE — 93010 ELECTROCARDIOGRAM REPORT: CPT

## 2018-08-14 RX ORDER — BACLOFEN 100 %
20 POWDER (GRAM) MISCELLANEOUS AT BEDTIME
Qty: 0 | Refills: 0 | Status: DISCONTINUED | OUTPATIENT
Start: 2018-08-14 | End: 2018-08-16

## 2018-08-14 RX ORDER — BACLOFEN 100 %
15 POWDER (GRAM) MISCELLANEOUS DAILY
Qty: 0 | Refills: 0 | Status: DISCONTINUED | OUTPATIENT
Start: 2018-08-14 | End: 2018-08-16

## 2018-08-14 RX ADMIN — OXYCODONE HYDROCHLORIDE 5 MILLIGRAM(S): 5 TABLET ORAL at 07:00

## 2018-08-14 RX ADMIN — Medication 20 MILLIGRAM(S): at 06:28

## 2018-08-14 RX ADMIN — PANTOPRAZOLE SODIUM 40 MILLIGRAM(S): 20 TABLET, DELAYED RELEASE ORAL at 06:29

## 2018-08-14 RX ADMIN — Medication 5 MILLIGRAM(S): at 18:02

## 2018-08-14 RX ADMIN — AMLODIPINE BESYLATE 5 MILLIGRAM(S): 2.5 TABLET ORAL at 06:28

## 2018-08-14 RX ADMIN — Medication 5 MILLIGRAM(S): at 07:00

## 2018-08-14 RX ADMIN — Medication 20 MILLIGRAM(S): at 22:09

## 2018-08-14 RX ADMIN — GABAPENTIN 600 MILLIGRAM(S): 400 CAPSULE ORAL at 06:29

## 2018-08-14 RX ADMIN — ENOXAPARIN SODIUM 30 MILLIGRAM(S): 100 INJECTION SUBCUTANEOUS at 06:29

## 2018-08-14 RX ADMIN — ENOXAPARIN SODIUM 30 MILLIGRAM(S): 100 INJECTION SUBCUTANEOUS at 18:02

## 2018-08-14 RX ADMIN — GABAPENTIN 600 MILLIGRAM(S): 400 CAPSULE ORAL at 18:02

## 2018-08-14 RX ADMIN — ROSUVASTATIN CALCIUM 10 MILLIGRAM(S): 5 TABLET ORAL at 22:08

## 2018-08-14 RX ADMIN — LOSARTAN POTASSIUM 25 MILLIGRAM(S): 100 TABLET, FILM COATED ORAL at 06:28

## 2018-08-14 NOTE — PROGRESS NOTE ADULT - SUBJECTIVE AND OBJECTIVE BOX
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 He was recently hospitalized in May 2018 2/2 radiation related myelopathy which initially responded well to steroids (requiring a long taper) and went to [facility] for Acute rehab.  He presented again on  for worsening weakness R>L lower extremity and decreased functional ability at home as well as low back pain. Weakness attributed to likely radiation treatment. Patient had imaging and showed no evidence of cord compression. Patient initiated back on steroids, patient wants to continue long term treatment with steroids and declined taper at [facility] but was discharged on taper.     Subjective:   Patient seen and examined in hallway on way to PT. Reports knee pain this morning attributed to using G-EO yesterday, improved with pain medication. Expresses concerns regarding buckling of his legs in the shower this morning, requesting more time in rehab to get used to his new AFO. Also reports increased swelling of RLE today.     [X] Constitutional WNL        [X] Cardio WNL              [X] Resp WNL  [X] GI WNL                        [X]  WNL                   [  ] Heme WNL  [X] Endo WNL                   [X] Skin WNL                  [  ] MSK WNL  [  ] Neuro WNL                 [X] Cognitive WNL          [X] Psych WNL    Physical Exam:  Vital Signs Last 24 Hrs  T(C): 36.8 (14 Aug 2018 09:14), Max: 36.8 (14 Aug 2018 09:14)  T(F): 98.2 (14 Aug 2018 09:14), Max: 98.2 (14 Aug 2018 09:14)  HR: 79 (14 Aug 2018 09:14) (73 - 79)  BP: 125/79 (14 Aug 2018 09:14) (109/68 - 143/88)  BP(mean): --  RR: 14 (14 Aug 2018 09:14) (14 - 14)  SpO2: 93% (14 Aug 2018 09:14) (93% - 94%)    Constitutional - NAD, Comfortable  Chest - CTAB  Cardiovascular - RRR, S1S2, no m/r/g  Abdomen - BS+, Soft, NTND  Extremities - right ankle edema  Neurologic Exam -                    Cognitive - Awake, Alert, AAO to self, place, date, year, situation     Communication - Fluent, No dysarthria     Motor - No focal deficits                    LEFT    UE - ShAB 5/5, EF 5/5, EE 5/5, WE 5/5,  5/5                    RIGHT UE - ShAB 5/5, EF 5/5, EE 5/5, WE 5/5,  5/5                    LEFT    LE - HF 5/5, KE 5/5, DF 5/5, PF 5/5                    RIGHT LE - HF 1/5, KE 4+/5, DF 3/5, PF 3/5        Sensory - Intact to LT     Reflexes - 2+ b/l patellar, symmetric  Psychiatric - Mood stable, Affect WNL    FUNCTIONAL STATUS  - Bed Mobility: Supervision with rolling, bridging    - Transfers: Min A  - Gait: 75ft RW CG/min A  - ADLs: Mod A - Sup; Transfers Min A    MEDICATIONS  (STANDING):  amLODIPine   Tablet 5 milliGRAM(s) Oral daily  baclofen 20 milliGRAM(s) Oral every 12 hours  dexamethasone     Tablet 5 milliGRAM(s) Oral two times a day  dextrose 5%. 1000 milliLiter(s) (50 mL/Hr) IV Continuous <Continuous>  dextrose 50% Injectable 12.5 Gram(s) IV Push once  dextrose 50% Injectable 25 Gram(s) IV Push once  dextrose 50% Injectable 25 Gram(s) IV Push once  enoxaparin Injectable 30 milliGRAM(s) SubCutaneous every 12 hours  gabapentin 600 milliGRAM(s) Oral two times a day  losartan 25 milliGRAM(s) Oral daily  multivitamin 1 Tablet(s) Oral daily  pantoprazole    Tablet 40 milliGRAM(s) Oral before breakfast  rosuvastatin 10 milliGRAM(s) Oral at bedtime  trimethoprim  160 mG/sulfamethoxazole 800 mG 1 Tablet(s) Oral <User Schedule>    MEDICATIONS  (PRN):  acetaminophen   Tablet. 650 milliGRAM(s) Oral every 6 hours PRN Mild Pain (1 - 3)  calcium carbonate    500 mG (Tums) Chewable 1 Tablet(s) Chew every 6 hours PRN Indigestion  dextrose 40% Gel 15 Gram(s) Oral once PRN Blood Glucose LESS THAN 70 milliGRAM(s)/deciliter  glucagon  Injectable 1 milliGRAM(s) IntraMuscular once PRN Glucose LESS THAN 70 milligrams/deciliter  oxyCODONE    IR 5 milliGRAM(s) Oral every 4 hours PRN Moderate Pain (4 - 6)  simethicone 80 milliGRAM(s) Chew two times a day PRN Gas    RECENT LABS/IMAGIN.4   10.4  )-----------( 126      ( 14 Aug 2018 05:05 )             39.7       131<L>  |  97  |  21  ----------------------------<  116<H>  4.5   |  31  |  0.70    Ca    8.9      14 Aug 2018 05:05    TPro  6.0  /  Alb  3.2<L>  /  TBili  0.3  /  DBili  x   /  AST  70<H>  /  ALT  294<H>  /  AlkPhos  74      ASSESSMENT/PLAN  SAM WALTER is a 57 year-old Man with a 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    COMORBIDITIES/ACTIVE MEDICAL ISSUES     Rehab: Gait Instability, ADL impairments and Functional impairments: continue Comprehensive Rehab Program of PT/OT   - R AFO for foot drop    Neuro/Myelopathy 2/2 Radiation - PT, Bowel and bladder no issues.   - Spasticity: c/w baclofen  - Pain: Neuropathic - gabapentin 600mg BID  - Anxiety: Xanax prn    Hem-Onc - metastatic NET  - currently on somatostatin (q4 weeks). Have spoken with RN of Dr. Ayala, Oncologist, ( [phone]) and Somatostatin inject can be delayed by 1 week (  or ).    - decadron taper; c/w bactrim ppx while on decadron  - Patient refused decadron on Friday evening until he was given an extra 1mg, said he did not want to drop from 12mg daily to 10mg daily. Per chart review, patient had already been tapered to 10mg daily since . Patient continued to deny taking 10mg total daily dose, insisted on receiving 11mg total. Discussed decadron taper with patient this morning, will decrease to 10mg total daily dose until discharge. Patient can then follow up with oncology for further management.     Cardio - HTN/HLD - c/w home meds for bp control, pt takes crestor at home and decline atorvastatin.     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 Recommended patient get new sneakers. Podiatry consult.     FEN   - Diet - Regular + Thins  (lactose free) recd CCHO diet but patient refused    Precautions / PROPHYLAXIS:   - Falls  - ortho: Weight bearing status: WBAT   - Lungs: Aspiration, Incentive Spirometer    - Pressure injury/Skin: Turn Q2hrs while in bed, OOB to Chair, PT/OT    - DVT: Lovenox, SCDs    Dispo: [facility] 8/15 to home with home therapies. Plan to receive Somatostain injection as scheduling per Dr. 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 He was recently hospitalized in May 2018 2/2 radiation related myelopathy which initially responded well to steroids (requiring a long taper) and went to [facility] for Acute rehab.  He presented again on  for worsening weakness R>L lower extremity and decreased functional ability at home as well as low back pain. Weakness attributed to likely radiation treatment. Patient had imaging and showed no evidence of cord compression. Patient initiated back on steroids, patient wants to continue long term treatment with steroids and declined taper at [facility] but was discharged on taper.     Subjective:   Patient seen and examined in hallway on way to PT. Reports knee pain this morning attributed to using G-EO yesterday, improved with pain medication. Expresses concerns regarding buckling of his legs in the shower this morning, requesting more time in rehab to get used to his new AFO. Also reports increased swelling of RLE today.     [X] Constitutional WNL        [X] Cardio WNL              [X] Resp WNL  [X] GI WNL                        [X]  WNL                   [  ] Heme WNL  [X] Endo WNL                   [X] Skin WNL                  [  ] MSK WNL  [  ] Neuro WNL                 [X] Cognitive WNL          [X] Psych WNL    Physical Exam:  Vital Signs Last 24 Hrs  T(C): 36.8 (14 Aug 2018 09:14), Max: 36.8 (14 Aug 2018 09:14)  T(F): 98.2 (14 Aug 2018 09:14), Max: 98.2 (14 Aug 2018 09:14)  HR: 79 (14 Aug 2018 09:14) (73 - 79)  BP: 125/79 (14 Aug 2018 09:14) (109/68 - 143/88)  BP(mean): --  RR: 14 (14 Aug 2018 09:14) (14 - 14)  SpO2: 93% (14 Aug 2018 09:14) (93% - 94%)    Constitutional - NAD, Comfortable  Chest - CTAB  Cardiovascular - RRR, S1S2, no m/r/g  Abdomen - BS+, Soft, NTND  Extremities - right ankle edema  Neurologic Exam -                    Cognitive - Awake, Alert, AAO to self, place, date, year, situation     Communication - Fluent, No dysarthria     Motor - No focal deficits                    LEFT    UE - ShAB 5/5, EF 5/5, EE 5/5, WE 5/5,  5/5                    RIGHT UE - ShAB 5/5, EF 5/5, EE 5/5, WE 5/5,  5/5                    LEFT    LE - HF 5/5, KE 5/5, DF 5/5, PF 5/5                    RIGHT LE - HF 1/5, KE 4+/5, DF 3/5, PF 3/5        Sensory - Intact to LT     Reflexes - 2+ b/l patellar, symmetric  Psychiatric - Mood stable, Affect WNL    FUNCTIONAL STATUS  - Bed Mobility: Supervision with rolling, bridging    - Transfers: Min A  - Gait: 75ft RW CG/min A  - ADLs: Mod A - Sup; Transfers Min A    MEDICATIONS  (STANDING):  amLODIPine   Tablet 5 milliGRAM(s) Oral daily  baclofen 20 milliGRAM(s) Oral every 12 hours  dexamethasone     Tablet 5 milliGRAM(s) Oral two times a day  dextrose 5%. 1000 milliLiter(s) (50 mL/Hr) IV Continuous <Continuous>  dextrose 50% Injectable 12.5 Gram(s) IV Push once  dextrose 50% Injectable 25 Gram(s) IV Push once  dextrose 50% Injectable 25 Gram(s) IV Push once  enoxaparin Injectable 30 milliGRAM(s) SubCutaneous every 12 hours  gabapentin 600 milliGRAM(s) Oral two times a day  losartan 25 milliGRAM(s) Oral daily  multivitamin 1 Tablet(s) Oral daily  pantoprazole    Tablet 40 milliGRAM(s) Oral before breakfast  rosuvastatin 10 milliGRAM(s) Oral at bedtime  trimethoprim  160 mG/sulfamethoxazole 800 mG 1 Tablet(s) Oral <User Schedule>    MEDICATIONS  (PRN):  acetaminophen   Tablet. 650 milliGRAM(s) Oral every 6 hours PRN Mild Pain (1 - 3)  calcium carbonate    500 mG (Tums) Chewable 1 Tablet(s) Chew every 6 hours PRN Indigestion  dextrose 40% Gel 15 Gram(s) Oral once PRN Blood Glucose LESS THAN 70 milliGRAM(s)/deciliter  glucagon  Injectable 1 milliGRAM(s) IntraMuscular once PRN Glucose LESS THAN 70 milligrams/deciliter  oxyCODONE    IR 5 milliGRAM(s) Oral every 4 hours PRN Moderate Pain (4 - 6)  simethicone 80 milliGRAM(s) Chew two times a day PRN Gas    RECENT LABS/IMAGIN.4   10.4  )-----------( 126      ( 14 Aug 2018 05:05 )             39.7       131<L>  |  97  |  21  ----------------------------<  116<H>  4.5   |  31  |  0.70    Ca    8.9      14 Aug 2018 05:05    TPro  6.0  /  Alb  3.2<L>  /  TBili  0.3  /  DBili  x   /  AST  70<H>  /  ALT  294<H>  /  AlkPhos  74      ASSESSMENT/PLAN  SAM WALTER is a 57 year-old Man with a 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    COMORBIDITIES/ACTIVE MEDICAL ISSUES     Rehab: Gait Instability, ADL impairments and Functional impairments: continue Comprehensive Rehab Program of PT/OT   - R AFO for foot drop    Neuro/Myelopathy 2/2 Radiation - PT, Bowel and bladder no issues.   - Spasticity: Increasing baclofen for LE spasticity  - Pain: Neuropathic - gabapentin 600mg BID  - Anxiety: Xanax prn    Hem-Onc - metastatic NET  - currently on somatostatin (q4 weeks). Have spoken with RN of Dr. Ayala, Oncologist, ( [phone]) and Somatostatin inject can be delayed by 1 week (  or ).    - decadron taper; c/w bactrim ppx while on decadron  - Patient refused decadron on Friday evening until he was given an extra 1mg, said he did not want to drop from 12mg daily to 10mg daily. Per chart review, patient had already been tapered to 10mg daily since . Patient continued to deny taking 10mg total daily dose, insisted on receiving 11mg total. Discussed decadron taper with pt, decreased to 10mg total daily dose until discharge. Patient can then follow up with oncology for further management.     Cardio - HTN/HLD - c/w home meds for bp control, pt takes crestor at home and decline atorvastatin.     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 Recommended patient get new sneakers. Podiatry consult.     FEN   - Diet - Regular + Thins  (lactose free) recd CCHO diet but patient refused    Precautions / PROPHYLAXIS:   - Falls  - ortho: Weight bearing status: WBAT   - Lungs: Aspiration, Incentive Spirometer    - Pressure injury/Skin: Turn Q2hrs while in bed, OOB to Chair, PT/OT    - DVT: Lovenox, SCDs; f/u LE dopplers given increased RLE swelling.     Dispo: DOM[facility] 8/15 to home with home therapies. Plan to receive Somatostain injection as scheduling per Dr. 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 He was recently hospitalized in May 2018 2/2 radiation related myelopathy which initially responded well to steroids (requiring a long taper) and went to [place] for Acute rehab.  He presented again on  for worsening weakness R>L lower extremity and decreased functional ability at home as well as low back pain. Weakness attributed to likely radiation treatment. Patient had imaging and showed no evidence of cord compression. Patient initiated back on steroids, patient wants to continue long term treatment with steroids and declined taper at [facility] but was discharged on taper.     Subjective:   Patient seen and examined in hallway on way to PT. Reports knee pain this morning attributed to using G-EO yesterday, improved with pain medication. Expresses concerns regarding buckling of his legs in the shower this morning, requesting more time in rehab to get used to his new AFO. Also reports increased swelling of RLE today.     [X] Constitutional WNL        [X] Cardio WNL              [X] Resp WNL  [X] GI WNL                        [X]  WNL                   [  ] Heme WNL  [X] Endo WNL                   [X] Skin WNL                  [  ] MSK WNL  [  ] Neuro WNL                 [X] Cognitive WNL          [X] Psych WNL    Physical Exam:  Vital Signs Last 24 Hrs  T(C): 36.8 (14 Aug 2018 09:14), Max: 36.8 (14 Aug 2018 09:14)  T(F): 98.2 (14 Aug 2018 09:14), Max: 98.2 (14 Aug 2018 09:14)  HR: 79 (14 Aug 2018 09:14) (73 - 79)  BP: 125/79 (14 Aug 2018 09:14) (109/68 - 143/88)  BP(mean): --  RR: 14 (14 Aug 2018 09:14) (14 - 14)  SpO2: 93% (14 Aug 2018 09:14) (93% - 94%)    Constitutional - NAD, Comfortable  Chest - CTAB  Cardiovascular - RRR, S1S2, no m/r/g  Abdomen - BS+, Soft, NTND  Extremities - right ankle edema  Neurologic Exam -                    Cognitive - Awake, Alert, AAO to self, place, date, year, situation     Communication - Fluent, No dysarthria     Motor - No focal deficits                    LEFT    UE - ShAB 5/5, EF 5/5, EE 5/5, WE 5/5,  5/5                    RIGHT UE - ShAB 5/5, EF 5/5, EE 5/5, WE 5/5,  5/5                    LEFT    LE - HF 5/5, KE 5/5, DF 5/5, PF 5/5                    RIGHT LE - HF 1/5, KE 4+/5, DF 3/5, PF 3/5        Sensory - Intact to LT     Reflexes - 2+ b/l patellar, symmetric     Tone: Right Hamstring MAS 2+  Psychiatric - Mood stable, Affect WNL    FUNCTIONAL STATUS  - Bed Mobility: Supervision with rolling, bridging    - Transfers: Min A  - Gait: 75ft RW CG/min A  - ADLs: Mod A - Sup; Transfers Min A    MEDICATIONS  (STANDING):  amLODIPine   Tablet 5 milliGRAM(s) Oral daily  baclofen 20 milliGRAM(s) Oral every 12 hours  dexamethasone     Tablet 5 milliGRAM(s) Oral two times a day  dextrose 5%. 1000 milliLiter(s) (50 mL/Hr) IV Continuous <Continuous>  dextrose 50% Injectable 12.5 Gram(s) IV Push once  dextrose 50% Injectable 25 Gram(s) IV Push once  dextrose 50% Injectable 25 Gram(s) IV Push once  enoxaparin Injectable 30 milliGRAM(s) SubCutaneous every 12 hours  gabapentin 600 milliGRAM(s) Oral two times a day  losartan 25 milliGRAM(s) Oral daily  multivitamin 1 Tablet(s) Oral daily  pantoprazole    Tablet 40 milliGRAM(s) Oral before breakfast  rosuvastatin 10 milliGRAM(s) Oral at bedtime  trimethoprim  160 mG/sulfamethoxazole 800 mG 1 Tablet(s) Oral <User Schedule>    MEDICATIONS  (PRN):  acetaminophen   Tablet. 650 milliGRAM(s) Oral every 6 hours PRN Mild Pain (1 - 3)  calcium carbonate    500 mG (Tums) Chewable 1 Tablet(s) Chew every 6 hours PRN Indigestion  dextrose 40% Gel 15 Gram(s) Oral once PRN Blood Glucose LESS THAN 70 milliGRAM(s)/deciliter  glucagon  Injectable 1 milliGRAM(s) IntraMuscular once PRN Glucose LESS THAN 70 milligrams/deciliter  oxyCODONE    IR 5 milliGRAM(s) Oral every 4 hours PRN Moderate Pain (4 - 6)  simethicone 80 milliGRAM(s) Chew two times a day PRN Gas    RECENT LABS/IMAGIN.4   10.4  )-----------( 126      ( 14 Aug 2018 05:05 )             39.7       131<L>  |  97  |  21  ----------------------------<  116<H>  4.5   |  31  |  0.70    Ca    8.9      14 Aug 2018 05:05    TPro  6.0  /  Alb  3.2<L>  /  TBili  0.3  /  DBili  x   /  AST  70<H>  /  ALT  294<H>  /  AlkPhos  74      ASSESSMENT/PLAN  SAM WALTER is a 57 year-old Man with a 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    COMORBIDITIES/ACTIVE MEDICAL ISSUES     Rehab: Gait Instability, ADL impairments and Functional impairments: continue Comprehensive Rehab Program of PT/OT   - R AFO for foot drop    Neuro/Myelopathy 2/2 Radiation - PT, Bowel and bladder no issues.   - Spasticity: Will decrease Baclofen 15mg qAM, 20mg qPM for improved spasticity control.   - Pain: Neuropathic - gabapentin 600mg BID  - Anxiety: Xanax prn    Hem-Onc - metastatic NET  - currently on somatostatin (q4 weeks). Have spoken with RN of Dr. Ayala, Oncologist, ( [phone]) and Somatostatin inject can be delayed by 1 week (  or ).    - decadron taper; c/w bactrim ppx while on decadron  - Patient refused decadron on Friday evening until he was given an extra 1mg, said he did not want to drop from 12mg daily to 10mg daily. Per chart review, patient had already been tapered to 10mg daily since . Patient continued to deny taking 10mg total daily dose, insisted on receiving 11mg total. Discussed decadron taper with pt, decreased to 10mg total daily dose until discharge. Patient can then follow up with oncology for further management.     Cardio - HTN/HLD - c/w home meds for bp control, pt takes crestor at home and decline atorvastatin.     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 Recommended patient get new sneakers. Podiatry consult.     FEN   - Diet - Regular + Thins  (lactose free) recd CCHO diet but patient refused    Precautions / PROPHYLAXIS:   - Falls  - ortho: Weight bearing status: WBAT   - Lungs: Aspiration, Incentive Spirometer    - Pressure injury/Skin: Turn Q2hrs while in bed, OOB to Chair, PT/OT    - DVT: Lovenox, SCDs; f/u LE dopplers given increased RLE swelling.     Dispo: DOMINIK 18 to home with home therapies. Plan to receive Somatostain injection as scheduling per Dr. Hancock after discharge ( after 18)

## 2018-08-14 NOTE — PROGRESS NOTE ADULT - ASSESSMENT
Pt is a 58yo M is admitted to acute rehab for radiation induced myopathy.     *radiation induced myelopathy  PT/OT  Continue steroid duong  Bactrim TIW for ppx while on steroid  Continue Gabapentin and Baclofen   Pain management     *left lower leg swelling  F/u bilateral leg doppler     *Neuroendocrine tumor with mets   Continue steroid  Injection at [facility] this [facility]  Follow up as out patient     *HLD  Cont lipitor     *HTN  Controlled  Continue current meds     *Anxiety  Xanex prn     *prophylactic measure  DVT ppx Lovenox   GI ppx Protonix  Steroid ppx Bactrim

## 2018-08-14 NOTE — PROGRESS NOTE ADULT - SUBJECTIVE AND OBJECTIVE BOX
HPI  Pt is a 56yo M is admitted to acute rehab for radiation induced myopathy.   Pt was seen and examined at bedside. Pt states he woke up with bilateral knee pain right more than left. During therapy this morning his legs buckled. Pt contribute to over worked in therapy yesterday.     Vital Signs Last 24 Hrs  T(C): 36.8 (14 Aug 2018 09:14), Max: 36.8 (14 Aug 2018 09:14)  T(F): 98.2 (14 Aug 2018 09:14), Max: 98.2 (14 Aug 2018 09:14)  HR: 79 (14 Aug 2018 09:14) (73 - 79)  BP: 125/79 (14 Aug 2018 09:14) (109/68 - 143/88)  BP(mean): --  RR: 14 (14 Aug 2018 09:14) (14 - 14)  SpO2: 93% (14 Aug 2018 09:14) (93% - 94%)    I&O's Summary    13 Aug 2018 07:01  -  14 Aug 2018 07:00  --------------------------------------------------------  IN: 0 mL / OUT: 2 mL / NET: -2 mL        CAPILLARY BLOOD GLUCOSE    PHYSICAL EXAM:    Constitutional: NAD, awake  HEENT: PERR, EOMI  Neck: Soft and supple  Respiratory: Breath sounds are clear bilaterally  Cardiovascular: S1 and S2  Gastrointestinal: Bowel Sounds present, soft, nontender  Extremities: bilateral lower leg swelling  Vascular: 2+ peripheral pulses  Neurological: A/O x 3    MEDICATIONS:  MEDICATIONS  (STANDING):  amLODIPine   Tablet 5 milliGRAM(s) Oral daily  baclofen 20 milliGRAM(s) Oral every 12 hours  dexamethasone     Tablet 5 milliGRAM(s) Oral two times a day  dextrose 5%. 1000 milliLiter(s) (50 mL/Hr) IV Continuous <Continuous>  dextrose 50% Injectable 12.5 Gram(s) IV Push once  dextrose 50% Injectable 25 Gram(s) IV Push once  dextrose 50% Injectable 25 Gram(s) IV Push once  enoxaparin Injectable 30 milliGRAM(s) SubCutaneous every 12 hours  gabapentin 600 milliGRAM(s) Oral two times a day  losartan 25 milliGRAM(s) Oral daily  multivitamin 1 Tablet(s) Oral daily  pantoprazole    Tablet 40 milliGRAM(s) Oral before breakfast  rosuvastatin 10 milliGRAM(s) Oral at bedtime  trimethoprim  160 mG/sulfamethoxazole 800 mG 1 Tablet(s) Oral <User Schedule>      LABS: All Labs Reviewed:                        12.4   10.4  )-----------( 126      ( 14 Aug 2018 05:05 )             39.7     08-14    131<L>  |  97  |  21  ----------------------------<  116<H>  4.5   |  31  |  0.70    Ca    8.9      14 Aug 2018 05:05    TPro  6.0  /  Alb  3.2<L>  /  TBili  0.3  /  DBili  x   /  AST  70<H>  /  ALT  294<H>  /  AlkPhos  74  08-14          Blood Culture:     RADIOLOGY/EKG:    DVT PPX:    ADVANCED DIRECTIVE:    DISPOSITION:

## 2018-08-14 NOTE — PROGRESS NOTE ADULT - ATTENDING COMMENTS
Patient discussed with Dr. Ching ,PM&R PGY. I agree with the above  assessment and plan. Patient adamant regarding Dexamethasone taper, will need to continue at 10mg daily due to increase over weekend. Dexamethasone taper as outpatient according to [facility] Oncologist and Neurologist. Medically stable. Continue rehabilitation efforts.
Pt. seen and examined.  Agree with documentation above as per resident. Patient medically stable. Pain controlled.
Patient discussed with Dr. Ching ,PM&R PGY 2. I agree with the above  assessment and plan.  Continue rehabilitation efforts.
Patient discussed with Dr. Ching ,PM&R PGY2. I agree with the above  assessment and plan. Medically stable. Continue rehabilitation efforts.
Patient discussed with Dr. Ching ,PM&R PGY2. I agree with the above  assessment and plan with my additional edits/corrections. Medically stable. Continue rehabilitation efforts.

## 2018-08-15 ENCOUNTER — TRANSCRIPTION ENCOUNTER (OUTPATIENT)
Age: 58
End: 2018-08-15

## 2018-08-15 LAB
ANION GAP SERPL CALC-SCNC: 8 MMOL/L — SIGNIFICANT CHANGE UP (ref 5–17)
BASOPHILS # BLD AUTO: 0 K/UL — SIGNIFICANT CHANGE UP (ref 0–0.2)
BASOPHILS NFR BLD AUTO: 0.3 % — SIGNIFICANT CHANGE UP (ref 0–2)
BUN SERPL-MCNC: 27 MG/DL — HIGH (ref 7–23)
CALCIUM SERPL-MCNC: 8.8 MG/DL — SIGNIFICANT CHANGE UP (ref 8.4–10.5)
CHLORIDE SERPL-SCNC: 101 MMOL/L — SIGNIFICANT CHANGE UP (ref 96–108)
CO2 SERPL-SCNC: 29 MMOL/L — SIGNIFICANT CHANGE UP (ref 22–31)
CREAT SERPL-MCNC: 0.73 MG/DL — SIGNIFICANT CHANGE UP (ref 0.5–1.3)
EOSINOPHIL # BLD AUTO: 0 K/UL — SIGNIFICANT CHANGE UP (ref 0–0.5)
EOSINOPHIL NFR BLD AUTO: 0.2 % — SIGNIFICANT CHANGE UP (ref 0–6)
GLUCOSE SERPL-MCNC: 110 MG/DL — HIGH (ref 70–99)
HCT VFR BLD CALC: 40.5 % — SIGNIFICANT CHANGE UP (ref 39–50)
HGB BLD-MCNC: 13.6 G/DL — SIGNIFICANT CHANGE UP (ref 13–17)
LYMPHOCYTES # BLD AUTO: 1.3 K/UL — SIGNIFICANT CHANGE UP (ref 1–3.3)
LYMPHOCYTES # BLD AUTO: 11.4 % — LOW (ref 13–44)
MCHC RBC-ENTMCNC: 32.1 PG — SIGNIFICANT CHANGE UP (ref 27–34)
MCHC RBC-ENTMCNC: 33.6 GM/DL — SIGNIFICANT CHANGE UP (ref 32–36)
MCV RBC AUTO: 95.6 FL — SIGNIFICANT CHANGE UP (ref 80–100)
MONOCYTES # BLD AUTO: 0.6 K/UL — SIGNIFICANT CHANGE UP (ref 0–0.9)
MONOCYTES NFR BLD AUTO: 5.1 % — SIGNIFICANT CHANGE UP (ref 2–14)
NEUTROPHILS # BLD AUTO: 9.2 K/UL — HIGH (ref 1.8–7.4)
NEUTROPHILS NFR BLD AUTO: 83 % — HIGH (ref 43–77)
PLATELET # BLD AUTO: 146 K/UL — LOW (ref 150–400)
POTASSIUM SERPL-MCNC: 4.8 MMOL/L — SIGNIFICANT CHANGE UP (ref 3.5–5.3)
POTASSIUM SERPL-SCNC: 4.8 MMOL/L — SIGNIFICANT CHANGE UP (ref 3.5–5.3)
RBC # BLD: 4.23 M/UL — SIGNIFICANT CHANGE UP (ref 4.2–5.8)
RBC # FLD: 14.6 % — HIGH (ref 10.3–14.5)
SODIUM SERPL-SCNC: 138 MMOL/L — SIGNIFICANT CHANGE UP (ref 135–145)
WBC # BLD: 11.2 K/UL — HIGH (ref 3.8–10.5)
WBC # FLD AUTO: 11.2 K/UL — HIGH (ref 3.8–10.5)

## 2018-08-15 PROCEDURE — 99232 SBSQ HOSP IP/OBS MODERATE 35: CPT

## 2018-08-15 RX ORDER — LOSARTAN POTASSIUM 100 MG/1
1 TABLET, FILM COATED ORAL
Qty: 0 | Refills: 0 | COMMUNITY
Start: 2018-08-15

## 2018-08-15 RX ORDER — BACLOFEN 100 %
1 POWDER (GRAM) MISCELLANEOUS
Qty: 105 | Refills: 0 | OUTPATIENT
Start: 2018-08-15 | End: 2018-09-13

## 2018-08-15 RX ORDER — BACLOFEN 100 %
1 POWDER (GRAM) MISCELLANEOUS
Qty: 0 | Refills: 0 | COMMUNITY
Start: 2018-08-15

## 2018-08-15 RX ORDER — ACETAMINOPHEN 500 MG
2 TABLET ORAL
Qty: 0 | Refills: 0 | COMMUNITY
Start: 2018-08-15

## 2018-08-15 RX ORDER — SIMETHICONE 80 MG/1
1 TABLET, CHEWABLE ORAL
Qty: 0 | Refills: 0 | COMMUNITY
Start: 2018-08-15

## 2018-08-15 RX ORDER — DEXAMETHASONE 0.5 MG/5ML
5 ELIXIR ORAL
Qty: 6 | Refills: 0 | OUTPATIENT
Start: 2018-08-15 | End: 2018-08-17

## 2018-08-15 RX ORDER — OXYCODONE HYDROCHLORIDE 5 MG/1
1 TABLET ORAL
Qty: 21 | Refills: 0 | OUTPATIENT
Start: 2018-08-15

## 2018-08-15 RX ORDER — DEXAMETHASONE 0.5 MG/5ML
5 ELIXIR ORAL
Qty: 0 | Refills: 0 | COMMUNITY
Start: 2018-08-15

## 2018-08-15 RX ORDER — OXYCODONE HYDROCHLORIDE 5 MG/1
1 TABLET ORAL
Qty: 0 | Refills: 0 | COMMUNITY
Start: 2018-08-15

## 2018-08-15 RX ORDER — GABAPENTIN 400 MG/1
2 CAPSULE ORAL
Qty: 0 | Refills: 0 | COMMUNITY
Start: 2018-08-15

## 2018-08-15 RX ORDER — PANTOPRAZOLE SODIUM 20 MG/1
1 TABLET, DELAYED RELEASE ORAL
Qty: 0 | Refills: 0 | COMMUNITY
Start: 2018-08-15

## 2018-08-15 RX ORDER — ALPRAZOLAM 0.25 MG
0.25 TABLET ORAL EVERY 12 HOURS
Qty: 0 | Refills: 0 | Status: DISCONTINUED | OUTPATIENT
Start: 2018-08-15 | End: 2018-08-16

## 2018-08-15 RX ORDER — AMLODIPINE BESYLATE 2.5 MG/1
1 TABLET ORAL
Qty: 0 | Refills: 0 | COMMUNITY
Start: 2018-08-15

## 2018-08-15 RX ORDER — CALCIUM CARBONATE 500(1250)
1 TABLET ORAL
Qty: 0 | Refills: 0 | COMMUNITY
Start: 2018-08-15

## 2018-08-15 RX ORDER — ROSUVASTATIN CALCIUM 5 MG/1
1 TABLET ORAL
Qty: 0 | Refills: 0 | COMMUNITY
Start: 2018-08-15

## 2018-08-15 RX ORDER — ALPRAZOLAM 0.25 MG
1 TABLET ORAL
Qty: 0 | Refills: 0 | COMMUNITY
Start: 2018-08-15

## 2018-08-15 RX ADMIN — AMLODIPINE BESYLATE 5 MILLIGRAM(S): 2.5 TABLET ORAL at 05:56

## 2018-08-15 RX ADMIN — GABAPENTIN 600 MILLIGRAM(S): 400 CAPSULE ORAL at 17:46

## 2018-08-15 RX ADMIN — Medication 15 MILLIGRAM(S): at 11:41

## 2018-08-15 RX ADMIN — LOSARTAN POTASSIUM 25 MILLIGRAM(S): 100 TABLET, FILM COATED ORAL at 05:56

## 2018-08-15 RX ADMIN — OXYCODONE HYDROCHLORIDE 5 MILLIGRAM(S): 5 TABLET ORAL at 01:07

## 2018-08-15 RX ADMIN — OXYCODONE HYDROCHLORIDE 5 MILLIGRAM(S): 5 TABLET ORAL at 06:03

## 2018-08-15 RX ADMIN — Medication 1 TABLET(S): at 08:35

## 2018-08-15 RX ADMIN — ROSUVASTATIN CALCIUM 10 MILLIGRAM(S): 5 TABLET ORAL at 21:38

## 2018-08-15 RX ADMIN — Medication 5 MILLIGRAM(S): at 05:57

## 2018-08-15 RX ADMIN — Medication 20 MILLIGRAM(S): at 21:39

## 2018-08-15 RX ADMIN — ENOXAPARIN SODIUM 30 MILLIGRAM(S): 100 INJECTION SUBCUTANEOUS at 05:57

## 2018-08-15 RX ADMIN — ENOXAPARIN SODIUM 30 MILLIGRAM(S): 100 INJECTION SUBCUTANEOUS at 17:46

## 2018-08-15 RX ADMIN — OXYCODONE HYDROCHLORIDE 5 MILLIGRAM(S): 5 TABLET ORAL at 01:06

## 2018-08-15 RX ADMIN — GABAPENTIN 600 MILLIGRAM(S): 400 CAPSULE ORAL at 05:57

## 2018-08-15 RX ADMIN — PANTOPRAZOLE SODIUM 40 MILLIGRAM(S): 20 TABLET, DELAYED RELEASE ORAL at 06:01

## 2018-08-15 RX ADMIN — Medication 5 MILLIGRAM(S): at 17:46

## 2018-08-15 NOTE — DISCHARGE NOTE ADULT - HOSPITAL COURSE
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 He was recently hospitalized in May 2018 2/2 radiation related myelopathy which initially responded well to steroids (requiring a long taper) and went to [facility] for Acute rehab.  He presented again on 7/24 for worsening weakness R>L lower extremity and decreased functional ability at home as well as low back pain. Weakness attributed to likely radiation treatment. Patient had imaging and showed no evidence of cord compression. Patient initiated back on steroids, patient expressed with to continue long term treatment with steroids and declined taper at [facility] but was discharged on taper.     Admitted to [facility] on 8/6/18 and participated in comprehensive rehabilitation program of PT/OT with improvement in gait, strength, endurance and completion of ADL's. Slow decadron taper was initiated during rehab stay, and will continue after discharge. Blood pressure well controlled on Norvasc 5mg and Lisinopril 25mg; Mr. Wei reported he also took Bystolic as a home medication, this was not added as blood pressure was already well controlled. Given xanax as needed for anxiety. Continued on baclofen for spasticity management; Gabapentin given for neuropathic pain. He was fitted for an AFO for foot drop and was instructed to wear during ambulation. Podiatry was consulted for erythema on bilateral posterior calcanei; no skin breakdown was noted, barrier cream applied. At time of discharge patient was independent for eating and grooming, Mod I for dressing and toileting and min A for tub/shower transfers; ambulating with RW 75ft with close supervision/contact guard. Mr. 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 He was recently hospitalized in May 2018 2/2 radiation related myelopathy which initially responded well to steroids (requiring a long taper) and went to [facility] for Acute rehab.  He presented again on 7/24 for worsening weakness R>L lower extremity and decreased functional ability at home as well as low back pain. Weakness attributed to likely radiation treatment. Patient had imaging and showed no evidence of cord compression. Patient initiated back on steroids, patient expressed with to continue long term treatment with steroids and declined taper at [facility] but was discharged on taper.     Admitted to [facility] on 8/6/18 and participated in comprehensive rehabilitation program of PT/OT with improvement in gait, strength, endurance and completion of ADL's. Slow decadron taper was initiated during rehab stay, and will continue after discharge. Blood pressure well controlled on Norvasc 5mg and Lisinopril 25mg; Mr. Wei reported he also took Bystolic as a home medication, this was not added as blood pressure was already well controlled. Given xanax as needed for anxiety. Continued on baclofen for spasticity management; Gabapentin given for neuropathic pain. He was fitted for an AFO for foot drop and was instructed to wear during ambulation. Podiatry was consulted for erythema on bilateral posterior calcanei; no skin breakdown was noted, barrier cream applied. At time of discharge patient was independent for eating and grooming, Mod I for dressing and toileting and min A for tub/shower transfers; ambulating with RW 75ft with close supervision/contact guard. Mr. Wei is recommended to have supervision at home for safety. He was recommended for home therapies, however he refused home therapies and opted for outpatient therapies, reported he would arrange for his own transport via [employer]. 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 He was recently hospitalized in May 2018 2/2 radiation related myelopathy which initially responded well to steroids (requiring a long taper) and went to [facility] for Acute rehab.  He presented again on 7/24 for worsening weakness R>L lower extremity and decreased functional ability at home as well as low back pain. Weakness attributed to likely radiation treatment. Patient had imaging and showed no evidence of cord compression. Patient initiated back on steroids, patient expressed with to continue long term treatment with steroids and declined taper at [facility] but was discharged on taper.     Admitted to [facility] on 8/6/18 and participated in comprehensive rehabilitation program of PT/OT with improvement in gait, strength, endurance and completion of ADL's. Slow decadron taper was initiated during rehab stay, and will continue after discharge. Blood pressure well controlled on Norvasc 5mg and Lisinopril 25mg; Mr. Wei reported he also took Bystolic as a home medication, this was not added as blood pressure was already well controlled. Given xanax as needed for anxiety. Continued on baclofen for spasticity management; Gabapentin given for neuropathic pain. He was fitted for an AFO for foot drop and was instructed to wear during ambulation. Podiatry was consulted for erythema on bilateral posterior calcanei; no skin breakdown was noted, barrier cream applied. He developed increased RLE spasticity, impeding therapies, increased Baclofen to 15mg qAM, 20mg PM with improvement. At time of discharge patient was independent for eating and grooming, Mod I for dressing and toileting and min A for tub/shower transfers; ambulating with RW 75ft with close supervision/contact guard. Rehabilitation team with recommendations of home PT/OT for supervision with transfers, car transfers, however,  Mr. Wei declined. He was provided a prescription for outpatient PT/OT and recommended to recieve transportation iwth family to outpatient therapy sessions. He was discharged home 8/16/18 with outpatient therapy.

## 2018-08-15 NOTE — DISCHARGE NOTE ADULT - CARE PLAN
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  Assessment and plan of treatment:	- Follow up with Neurology for dexamethasone taper  - Outpatient PT  - Wear AFO for ambulation  - Continue taking Baclofen for spasticity and gabapentin for neuropathic pain  Secondary Diagnosis:	Metastatic cancer  Goal:	Follow up with Oncology  Assessment and plan of treatment:	- Follow up with Oncology  Dr. Ayala for chemotherapy  - Continue taking Bactrim while on steroids.  Secondary Diagnosis:	Essential hypertension  Goal:	Continue taking Norvasc 5mg daily and Losartan 25mg daily for blood pressure control  Assessment and plan of treatment:	Blood pressure well controlled on current regimen. Follow up with your primary care doctor regarding continued need for other anti-hypertensive agents.  Secondary Diagnosis:	Anxiety  Goal:	Continue taking xanax as needed for anxiety.

## 2018-08-15 NOTE — DISCHARGE NOTE ADULT - MEDICATION SUMMARY - MEDICATIONS TO TAKE
I will START or STAY ON the medications listed below when I get home from the hospital:    dexamethasone 1 mg oral tablet  -- 5 tab(s) by mouth 2 times a day. Please see Oncologist for further steroid taper instructions  -- Indication: For Steroid taper for Myelopathy    oxyCODONE 5 mg oral tablet  -- 1 tab(s) by mouth every 8 hours, As Needed -Moderate Pain (4 - 6) MDD:6   -- Indication: For Pain Management    acetaminophen 325 mg oral tablet  -- 2 tab(s) by mouth every 6 hours, As needed, Mild Pain (1 - 3)  -- Indication: For Pain Management    losartan 25 mg oral tablet  -- 1 tab(s) by mouth once a day  -- Indication: For Hypertension    calcium carbonate 500 mg (200 mg elemental calcium) oral tablet, chewable  -- 1 tab(s) by mouth every 6 hours, As needed, Indigestion  -- Indication: For Acid Reflux    gabapentin 300 mg oral capsule  -- 2 cap(s) by mouth 2 times a day  -- Indication: For Neuropathic pain    rosuvastatin 10 mg oral tablet  -- 1 tab(s) by mouth once a day (at bedtime)  -- Indication: For High Cholesterol    ALPRAZolam 0.25 mg oral tablet  -- 1 tab(s) by mouth every 12 hours, As needed, anxiety  -- Indication: For Anxiety    amLODIPine 5 mg oral tablet  -- 1 tab(s) by mouth once a day  -- Indication: For High Blood Pressure    simethicone 80 mg oral tablet, chewable  -- 1 tab(s) by mouth 2 times a day, As needed, Gas  -- Indication: For Gas    baclofen 10 mg oral tablet  -- Take by mouth, 1 1/2 tab(s) in AM ( 15mg ), AND 2 tabs in PM ( 20mg)  -- It is very important that you take or use this exactly as directed.  Do not skip doses or discontinue unless directed by your doctor.  May cause drowsiness.  Alcohol may intensify this effect.  Use care when operating dangerous machinery.  Obtain medical advice before taking any non-prescription drugs as some may affect the action of this medication.    -- Indication: For Spasticity    pantoprazole 40 mg oral delayed release tablet  -- 1 tab(s) by mouth once a day (before a meal)  -- Indication: For Acid Reflux    sulfamethoxazole-trimethoprim 800 mg-160 mg oral tablet  -- 1 tab(s) by mouth   -- Indication: For Prophylactic Antibiotic with Steroid use    Multiple Vitamins oral tablet  -- 1 tab(s) by mouth once a day  -- Indication: For Multivitamin I will START or STAY ON the medications listed below when I get home from the hospital:    dexamethasone 1 mg oral tablet  -- 5 tab(s) by mouth 2 times a day. Please see Oncologist for further steroid taper instructions  -- Indication: For Steroid taper for Myelopathy    oxyCODONE 5 mg oral tablet  -- 1 tab(s) by mouth every 8 hours, As Needed -Moderate Pain (4 - 6) MDD:6   -- Indication: For Pain Management    acetaminophen 325 mg oral tablet  -- 2 tab(s) by mouth every 6 hours, As needed, Mild Pain (1 - 3)  -- Indication: For Pain Management    losartan 25 mg oral tablet  -- 1 tab(s) by mouth once a day  -- Indication: For High Blood Pressure    calcium carbonate 500 mg (200 mg elemental calcium) oral tablet, chewable  -- 1 tab(s) by mouth every 6 hours, As needed, Indigestion  -- Indication: For Acid Reflux    gabapentin 300 mg oral capsule  -- 2 cap(s) by mouth 2 times a day  -- Indication: For Neuropathic Pain    rosuvastatin 10 mg oral tablet  -- 1 tab(s) by mouth once a day (at bedtime)  -- Indication: For High Cholesterol    ALPRAZolam 0.25 mg oral tablet  -- 1 tab(s) by mouth every 12 hours, As needed, anxiety  -- Indication: For Anxiety    amLODIPine 5 mg oral tablet  -- 1 tab(s) by mouth once a day  -- Indication: For High Blood Pressure    simethicone 80 mg oral tablet, chewable  -- 1 tab(s) by mouth 2 times a day, As needed, Gas  -- Indication: For Gas    baclofen 10 mg oral tablet  -- Take by mouth, 1 1/2 tab(s) in AM ( 15mg ), AND 2 tabs in PM ( 20mg)  -- It is very important that you take or use this exactly as directed.  Do not skip doses or discontinue unless directed by your doctor.  May cause drowsiness.  Alcohol may intensify this effect.  Use care when operating dangerous machinery.  Obtain medical advice before taking any non-prescription drugs as some may affect the action of this medication.    -- Indication: For Spasticity    pantoprazole 40 mg oral delayed release tablet  -- 1 tab(s) by mouth once a day (before a meal)  -- Indication: For Acid Reflux    sulfamethoxazole-trimethoprim 800 mg-160 mg oral tablet  -- 1 tab(s) by mouth once a day   -- Indication: For Antibiotic prophylaxis with Steroid Use    Multiple Vitamins oral tablet  -- 1 tab(s) by mouth once a day  -- Indication: For Multivitamin

## 2018-08-15 NOTE — DISCHARGE NOTE ADULT - CARE PROVIDER_API CALL
Marta Jenkins), Surgery  Phys Medicine  [address]  [address] [place] [address]  Phone: [phone]  Fax: [phone]

## 2018-08-15 NOTE — DISCHARGE NOTE ADULT - PATIENT PORTAL LINK FT
You can access the [facility] Patient Portal, offered by [facility], by registering with the following website: [email or web address]

## 2018-08-15 NOTE — DISCHARGE NOTE ADULT - PLAN OF CARE
Follow up with Oncology and Neurology within 2 days of discharge. - Follow up with Neurology for dexamethasone taper  - Outpatient PT  - Wear AFO for ambulation  - Continue taking Baclofen for spasticity and gabapentin for neuropathic pain Follow up with Oncology - Follow up with Oncology  Dr. Ayala for chemotherapy  - Continue taking Bactrim while on steroids. Continue taking Norvasc 5mg daily and Losartan 25mg daily for blood pressure control Blood pressure well controlled on current regimen. Follow up with your primary care doctor regarding continued need for other anti-hypertensive agents. Continue taking xanax as needed for anxiety.

## 2018-08-16 VITALS
DIASTOLIC BLOOD PRESSURE: 83 MMHG | SYSTOLIC BLOOD PRESSURE: 129 MMHG | TEMPERATURE: 98 F | HEART RATE: 78 BPM | OXYGEN SATURATION: 94 % | RESPIRATION RATE: 14 BRPM

## 2018-08-16 PROCEDURE — 97530 THERAPEUTIC ACTIVITIES: CPT

## 2018-08-16 PROCEDURE — 97535 SELF CARE MNGMENT TRAINING: CPT

## 2018-08-16 PROCEDURE — 83036 HEMOGLOBIN GLYCOSYLATED A1C: CPT

## 2018-08-16 PROCEDURE — 97167 OT EVAL HIGH COMPLEX 60 MIN: CPT

## 2018-08-16 PROCEDURE — 93005 ELECTROCARDIOGRAM TRACING: CPT

## 2018-08-16 PROCEDURE — 85027 COMPLETE CBC AUTOMATED: CPT

## 2018-08-16 PROCEDURE — 97112 NEUROMUSCULAR REEDUCATION: CPT

## 2018-08-16 PROCEDURE — 97110 THERAPEUTIC EXERCISES: CPT

## 2018-08-16 PROCEDURE — 97163 PT EVAL HIGH COMPLEX 45 MIN: CPT

## 2018-08-16 PROCEDURE — 82962 GLUCOSE BLOOD TEST: CPT

## 2018-08-16 PROCEDURE — 99238 HOSP IP/OBS DSCHRG MGMT 30/<: CPT

## 2018-08-16 PROCEDURE — 93970 EXTREMITY STUDY: CPT

## 2018-08-16 PROCEDURE — 80048 BASIC METABOLIC PNL TOTAL CA: CPT

## 2018-08-16 PROCEDURE — 80053 COMPREHEN METABOLIC PANEL: CPT

## 2018-08-16 PROCEDURE — 97116 GAIT TRAINING THERAPY: CPT

## 2018-08-16 RX ORDER — LOSARTAN POTASSIUM 100 MG/1
1 TABLET, FILM COATED ORAL
Qty: 30 | Refills: 0 | OUTPATIENT
Start: 2018-08-16 | End: 2018-09-14

## 2018-08-16 RX ORDER — BACLOFEN 100 %
1 POWDER (GRAM) MISCELLANEOUS
Qty: 105 | Refills: 0 | OUTPATIENT
Start: 2018-08-16 | End: 2018-09-14

## 2018-08-16 RX ORDER — BACLOFEN 100 %
1 POWDER (GRAM) MISCELLANEOUS
Qty: 105 | Refills: 1 | OUTPATIENT
Start: 2018-08-16 | End: 2018-10-14

## 2018-08-16 RX ORDER — DEXAMETHASONE 0.5 MG/5ML
5 ELIXIR ORAL
Qty: 6 | Refills: 0 | OUTPATIENT
Start: 2018-08-16 | End: 2018-08-18

## 2018-08-16 RX ORDER — GABAPENTIN 400 MG/1
2 CAPSULE ORAL
Qty: 120 | Refills: 0 | OUTPATIENT
Start: 2018-08-16 | End: 2018-09-14

## 2018-08-16 RX ORDER — AMLODIPINE BESYLATE 2.5 MG/1
1 TABLET ORAL
Qty: 30 | Refills: 0 | OUTPATIENT
Start: 2018-08-16 | End: 2018-09-14

## 2018-08-16 RX ORDER — OXYCODONE HYDROCHLORIDE 5 MG/1
1 TABLET ORAL
Qty: 21 | Refills: 0 | OUTPATIENT
Start: 2018-08-16 | End: 2018-08-22

## 2018-08-16 RX ORDER — PANTOPRAZOLE SODIUM 20 MG/1
1 TABLET, DELAYED RELEASE ORAL
Qty: 30 | Refills: 0 | OUTPATIENT
Start: 2018-08-16 | End: 2018-09-14

## 2018-08-16 RX ORDER — CALCIUM CARBONATE 500(1250)
1 TABLET ORAL
Qty: 120 | Refills: 0 | OUTPATIENT
Start: 2018-08-16 | End: 2018-09-14

## 2018-08-16 RX ADMIN — Medication 15 MILLIGRAM(S): at 11:39

## 2018-08-16 RX ADMIN — AMLODIPINE BESYLATE 5 MILLIGRAM(S): 2.5 TABLET ORAL at 05:51

## 2018-08-16 RX ADMIN — LOSARTAN POTASSIUM 25 MILLIGRAM(S): 100 TABLET, FILM COATED ORAL at 05:51

## 2018-08-16 RX ADMIN — PANTOPRAZOLE SODIUM 40 MILLIGRAM(S): 20 TABLET, DELAYED RELEASE ORAL at 05:51

## 2018-08-16 RX ADMIN — GABAPENTIN 600 MILLIGRAM(S): 400 CAPSULE ORAL at 05:51

## 2018-08-16 RX ADMIN — ENOXAPARIN SODIUM 30 MILLIGRAM(S): 100 INJECTION SUBCUTANEOUS at 05:51

## 2018-08-16 RX ADMIN — Medication 5 MILLIGRAM(S): at 05:51

## 2018-08-16 NOTE — PROGRESS NOTE ADULT - PROVIDER SPECIALTY LIST ADULT
Hospitalist
Hospitalist
Physiatry
Rehab Medicine
Rehab Medicine
Physiatry

## 2018-08-16 NOTE — PROGRESS NOTE ADULT - SUBJECTIVE AND OBJECTIVE BOX
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 He was recently hospitalized in May 2018 2/2 radiation related myelopathy which initially responded well to steroids (requiring a long taper) and went to [facility] for Acute rehab.  He presented again on  for worsening weakness R>L lower extremity and decreased functional ability at home as well as low back pain. Weakness attributed to likely radiation treatment. Patient had imaging and showed no evidence of cord compression. Patient initiated back on steroids, patient wants to continue long term treatment with steroids and declined taper at [facility] but was discharged on taper.     Subjective:   Patient seen and examined at bedside. No acute complaints this AM, excited for discharge home today.     [X] Constitutional WNL        [X] Cardio WNL              [X] Resp WNL  [X] GI WNL                        [X]  WNL                   [  ] Heme WNL  [X] Endo WNL                   [X] Skin WNL                  [  ] MSK WNL  [  ] Neuro WNL                 [X] Cognitive WNL          [X] Psych WNL    Physical Exam:  Vital Signs Last 24 Hrs  T(C): 36.8 (16 Aug 2018 07:45), Max: 36.8 (16 Aug 2018 07:45)  T(F): 98.3 (16 Aug 2018 07:45), Max: 98.3 (16 Aug 2018 07:45)  HR: 78 (16 Aug 2018 07:45) (78 - 89)  BP: 126/83 (16 Aug 2018 07:45) (119/80 - 135/87)  BP(mean): --  RR: 14 (16 Aug 2018 07:45) (14 - 14)  SpO2: 94% (16 Aug 2018 07:45) (93% - 94%)    Constitutional - NAD, Comfortable  Chest - CTAB  Cardiovascular - RRR, S1S2, no m/r/g  Abdomen - BS+, Soft, NTND  Extremities - right ankle edema  Neurologic Exam -                    Cognitive - Awake, Alert, AAO to self, place, date, year, situation     Communication - Fluent, No dysarthria     Motor - No focal deficits                    LEFT    UE - ShAB 5/5, EF 5/5, EE 5/5, WE 5/5,  5/5                    RIGHT UE - ShAB 5/5, EF 5/5, EE 5/5, WE 5/5,  5/5                    LEFT    LE - HF 5/5, KE 5/5, DF 5/5, PF 5/5                    RIGHT LE - HF 1/5, KE 4+/5, DF 3/5, PF 3/5        Sensory - Intact to LT     Reflexes - 2+ b/l patellar, symmetric     Tone: Right Hamstring MAS 2+  Psychiatric - Mood stable, Affect WNL    FUNCTIONAL STATUS  - Bed Mobility: Supervision with rolling, bridging    - Transfers: Min A  - Gait: 75ft RW CG/min A  - ADLs: Mod A - Sup; Transfers Min A    MEDICATIONS  (STANDING):  amLODIPine   Tablet 5 milliGRAM(s) Oral daily  baclofen 15 milliGRAM(s) Oral daily  baclofen 20 milliGRAM(s) Oral at bedtime  dextrose 5%. 1000 milliLiter(s) (50 mL/Hr) IV Continuous <Continuous>  dextrose 50% Injectable 12.5 Gram(s) IV Push once  dextrose 50% Injectable 25 Gram(s) IV Push once  dextrose 50% Injectable 25 Gram(s) IV Push once  enoxaparin Injectable 30 milliGRAM(s) SubCutaneous every 12 hours  gabapentin 600 milliGRAM(s) Oral two times a day  losartan 25 milliGRAM(s) Oral daily  multivitamin 1 Tablet(s) Oral daily  pantoprazole    Tablet 40 milliGRAM(s) Oral before breakfast  rosuvastatin 10 milliGRAM(s) Oral at bedtime  trimethoprim  160 mG/sulfamethoxazole 800 mG 1 Tablet(s) Oral <User Schedule>    MEDICATIONS  (PRN):  acetaminophen   Tablet. 650 milliGRAM(s) Oral every 6 hours PRN Mild Pain (1 - 3)  ALPRAZolam 0.25 milliGRAM(s) Oral every 12 hours PRN anxiety  calcium carbonate    500 mG (Tums) Chewable 1 Tablet(s) Chew every 6 hours PRN Indigestion  dextrose 40% Gel 15 Gram(s) Oral once PRN Blood Glucose LESS THAN 70 milliGRAM(s)/deciliter  glucagon  Injectable 1 milliGRAM(s) IntraMuscular once PRN Glucose LESS THAN 70 milligrams/deciliter  oxyCODONE    IR 5 milliGRAM(s) Oral every 4 hours PRN Moderate Pain (4 - 6)  simethicone 80 milliGRAM(s) Chew two times a day PRN Gas      RECENT LABS/IMAGIN.4   10.4  )-----------( 126      ( 14 Aug 2018 05:05 )             39.7       131<L>  |  97  |  21  ----------------------------<  116<H>  4.5   |  31  |  0.70    Ca    8.9      14 Aug 2018 05:05    TPro  6.0  /  Alb  3.2<L>  /  TBili  0.3  /  DBili  x   /  AST  70<H>  /  ALT  294<H>  /  AlkPhos  74      ASSESSMENT/PLAN  SAM WALTER is a 57 year-old Man with a 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    COMORBIDITIES/ACTIVE MEDICAL ISSUES     Rehab: Gait Instability, ADL impairments and Functional impairments: continue Comprehensive Rehab Program of PT/OT   - R AFO for foot drop    Neuro/Myelopathy 2/2 Radiation - PT, Bowel and bladder no issues.   - Spasticity: Baclofen 15mg qAM, 20mg qPM for improved spasticity control.   - Pain: Neuropathic - gabapentin 600mg BID  - Anxiety: Xanax prn    Hem-Onc - metastatic NET  - currently on somatostatin (q4 weeks). Have spoken with RN of Dr. Ayala, Oncologist, ( [phone]) and Somatostatin inject can be delayed by 1 week (  or ).    - decadron taper; c/w bactrim ppx while on decadron  - Patient refused decadron on Friday evening until he was given an extra 1mg, said he did not want to drop from 12mg daily to 10mg daily. Per chart review, patient had already been tapered to 10mg daily since . Patient continued to deny taking 10mg total daily dose, insisted on receiving 11mg total. Discussed decadron taper with pt, decreased to 10mg total daily dose until discharge. Patient can then follow up with oncology for further management.     Cardio - HTN/HLD - c/w home meds for bp control, pt takes crestor at home and decline atorvastatin.     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 Recommended patient get new sneakers. Podiatry consult.     FEN   - Diet - Regular + Thins  (lactose free) recd CCHO diet but patient refused    Precautions / PROPHYLAXIS:   - Falls  - ortho: Weight bearing status: WBAT   - Lungs: Aspiration, Incentive Spirometer    - Pressure injury/Skin: Turn Q2hrs while in bed, OOB to Chair, PT/OT    - DVT: Lovenox, SCDs; f/u LE dopplers negative for DVT.      Dispo: DOMINIK 18 to home with outpatient PT/OT. Plan to receive Somatostain injection as scheduling per Dr. Hancock after discharge ( after 18)

## 2019-12-05 PROBLEM — C79.9 SECONDARY MALIGNANT NEOPLASM OF UNSPECIFIED SITE: Chronic | Status: ACTIVE | Noted: 2018-08-06

## 2019-12-05 PROBLEM — G99.2 MYELOPATHY IN DISEASES CLASSIFIED ELSEWHERE: Chronic | Status: ACTIVE | Noted: 2018-08-06

## 2019-12-05 PROBLEM — R73.9 HYPERGLYCEMIA, UNSPECIFIED: Chronic | Status: ACTIVE | Noted: 2018-08-06

## 2019-12-05 PROBLEM — K56.609 UNSPECIFIED INTESTINAL OBSTRUCTION, UNSPECIFIED AS TO PARTIAL VERSUS COMPLETE OBSTRUCTION: Chronic | Status: ACTIVE | Noted: 2018-08-06

## 2019-12-05 PROBLEM — F41.9 ANXIETY DISORDER, UNSPECIFIED: Chronic | Status: ACTIVE | Noted: 2018-08-06

## 2019-12-05 PROBLEM — I10 ESSENTIAL (PRIMARY) HYPERTENSION: Chronic | Status: ACTIVE | Noted: 2018-08-06

## 2019-12-05 PROBLEM — D44.9 NEOPLASM OF UNCERTAIN BEHAVIOR OF UNSPECIFIED ENDOCRINE GLAND: Chronic | Status: ACTIVE | Noted: 2018-08-06

## 2019-12-05 PROBLEM — R25.2 CRAMP AND SPASM: Chronic | Status: ACTIVE | Noted: 2018-08-06

## 2019-12-13 ENCOUNTER — OUTPATIENT (OUTPATIENT)
Dept: OUTPATIENT SERVICES | Facility: HOSPITAL | Age: 59
LOS: 1 days | Discharge: HOME | End: 2019-12-13
Payer: MEDICAID

## 2019-12-13 DIAGNOSIS — Z90.49 ACQUIRED ABSENCE OF OTHER SPECIFIED PARTS OF DIGESTIVE TRACT: Chronic | ICD-10-CM

## 2019-12-13 DIAGNOSIS — C71.9 MALIGNANT NEOPLASM OF BRAIN, UNSPECIFIED: ICD-10-CM

## 2019-12-19 ENCOUNTER — APPOINTMENT (OUTPATIENT)
Dept: HEMATOLOGY ONCOLOGY | Facility: CLINIC | Age: 59
End: 2019-12-19
Payer: COMMERCIAL

## 2019-12-19 VITALS
TEMPERATURE: 97.8 F | HEART RATE: 63 BPM | DIASTOLIC BLOOD PRESSURE: 80 MMHG | OXYGEN SATURATION: 98 % | WEIGHT: 148 LBS | SYSTOLIC BLOOD PRESSURE: 127 MMHG | HEIGHT: 68 IN | RESPIRATION RATE: 18 BRPM | BODY MASS INDEX: 22.43 KG/M2

## 2019-12-19 DIAGNOSIS — Z82.49 FAMILY HISTORY OF ISCHEMIC HEART DISEASE AND OTHER DISEASES OF THE CIRCULATORY SYSTEM: ICD-10-CM

## 2019-12-19 DIAGNOSIS — G95.29 OTHER CORD COMPRESSION: ICD-10-CM

## 2019-12-19 DIAGNOSIS — Z78.9 OTHER SPECIFIED HEALTH STATUS: ICD-10-CM

## 2019-12-19 DIAGNOSIS — C79.51 OTHER CORD COMPRESSION: ICD-10-CM

## 2019-12-19 DIAGNOSIS — Z83.3 FAMILY HISTORY OF DIABETES MELLITUS: ICD-10-CM

## 2019-12-19 DIAGNOSIS — Z86.718 PERSONAL HISTORY OF OTHER VENOUS THROMBOSIS AND EMBOLISM: ICD-10-CM

## 2019-12-19 PROCEDURE — 99205 OFFICE O/P NEW HI 60 MIN: CPT

## 2019-12-20 NOTE — REVIEW OF SYSTEMS
[Recent Change In Weight] : ~T recent weight change [Joint Pain] : joint pain [Muscle Pain] : muscle pain [Anxiety] : anxiety [FreeTextEntry4] : Seasonal sinus congestion [Negative] : Allergic/Immunologic [FreeTextEntry7] : Previous history of heartburn which has resolved. [FreeTextEntry8] : Has indwelling urinary catheter [de-identified] : Paralysis of both lower extremities [Fever] : no fever [Night Sweats] : no night sweats [Chills] : no chills [Fatigue] : no fatigue [Suicidal] : not suicidal [Insomnia] : no insomnia [Depression] : no depression [FreeTextEntry9] : muscle pain, wrist pain [de-identified] : difficult to sleep

## 2019-12-20 NOTE — CONSULT LETTER
[Consult Letter:] : I had the pleasure of evaluating your patient, [unfilled]. [Dear  ___] : Dear  [unfilled], [Please see my note below.] : Please see my note below. [Sincerely,] : Sincerely, [Consult Closing:] : Thank you very much for allowing me to participate in the care of this patient.  If you have any questions, please do not hesitate to contact me. [FreeTextEntry2] : Dr. Liliana Ayala [FreeTextEntry3] : Neri Altamirano M.D. \par [facility] of Oncology and Hematology\par [facility] \par Professor of Medicine\par [facility] [DrPriscilla  ___] : Dr. MON

## 2019-12-20 NOTE — CONSULT LETTER
[Dear  ___] : Dear  [unfilled], [Consult Letter:] : I had the pleasure of evaluating your patient, [unfilled]. [Please see my note below.] : Please see my note below. [Sincerely,] : Sincerely, [Consult Closing:] : Thank you very much for allowing me to participate in the care of this patient.  If you have any questions, please do not hesitate to contact me. [FreeTextEntry2] : Dr. Liliana Ayala [FreeTextEntry3] : Neri Altamirano M.D. \par [facility] Division of Oncology and Hematology\par [facility] \par Professor of Medicine\par [facility] [DrPriscilla  ___] : Dr. MON

## 2019-12-20 NOTE — PHYSICAL EXAM
[Restricted in physically strenuous activity but ambulatory and able to carry out work of a light or sedentary nature] : Status 1- Restricted in physically strenuous activity but ambulatory and able to carry out work of a light or sedentary nature, e.g., light house work, office work [Normal] : affect appropriate [de-identified] : Hemiplegia of the both lower extremities

## 2019-12-20 NOTE — REVIEW OF SYSTEMS
[Recent Change In Weight] : ~T recent weight change [Joint Pain] : joint pain [Muscle Pain] : muscle pain [Anxiety] : anxiety [FreeTextEntry4] : Seasonal sinus congestion [Negative] : Allergic/Immunologic [FreeTextEntry7] : Previous history of heartburn which has resolved. [FreeTextEntry8] : Has indwelling urinary catheter [de-identified] : Paralysis of both lower extremities [Fever] : no fever [Chills] : no chills [Night Sweats] : no night sweats [Fatigue] : no fatigue [Suicidal] : not suicidal [Depression] : no depression [Insomnia] : no insomnia [FreeTextEntry9] : muscle pain, wrist pain [de-identified] : difficult to sleep

## 2019-12-20 NOTE — REASON FOR VISIT
[Friend] : friend [Initial Consultation] : an initial consultation [FreeTextEntry2] : Neuroendocrine tumor of the small bowel

## 2019-12-20 NOTE — ASSESSMENT
[Palliative] : Goals of care discussed with patient: Palliative [FreeTextEntry1] : A discussion took place but the patient had a close friend regarding further management. 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 He continues rehabilitation evaluation for further physical therapy for his bilateral lower extremity hemiplegia. He is currently in the process of fixing up his home that he can function with his disability. The patient appears to be tolerating his Xarelto for previous DVT. Because of his history of retroperitoneal hematoma this will be reevaluated at his next visit. He will require followup CAT scans in 6 months to assess for response or progression of his disease. We discussed the fact that although there are other options for treatment the present disease status does not indicate changing his therapy. He'll be starting his monthly injections in our office in January 2020. We will also arrange neurology followup for him to control his neuromuscular symptoms. He will return in one month for followup. [Palliative Care Plan] : not applicable at this time

## 2019-12-20 NOTE — REASON FOR VISIT
[Initial Consultation] : an initial consultation [Friend] : friend [FreeTextEntry2] : Neuroendocrine tumor of the small bowel

## 2019-12-20 NOTE — PHYSICAL EXAM
[Restricted in physically strenuous activity but ambulatory and able to carry out work of a light or sedentary nature] : Status 1- Restricted in physically strenuous activity but ambulatory and able to carry out work of a light or sedentary nature, e.g., light house work, office work [Normal] : affect appropriate [de-identified] : Hemiplegia of the both lower extremities

## 2019-12-20 NOTE — ASSESSMENT
[FreeTextEntry1] : A discussion took place but the patient had a close friend regarding further management. 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 He continues rehabilitation evaluation for further physical therapy for his bilateral lower extremity hemiplegia. He is currently in the process of fixing up his home that he can function with his disability. The patient appears to be tolerating his Xarelto for previous DVT. Because of his history of retroperitoneal hematoma this will be reevaluated at his next visit. He will require followup CAT scans in 6 months to assess for response or progression of his disease. We discussed the fact that although there are other options for treatment the present disease status does not indicate changing his therapy. He'll be starting his monthly injections in our office in January 2020. We will also arrange neurology followup for him to control his neuromuscular symptoms. He will return in one month for followup. [Palliative] : Goals of care discussed with patient: Palliative [Palliative Care Plan] : not applicable at this time

## 2019-12-20 NOTE — HISTORY OF PRESENT ILLNESS
[Disease: _____________________] : Disease: [unfilled] [M: ___] : M[unfilled] [de-identified] : This is a 59-year-old man with history of neuroendocrine tumor arising from the small bowel. The patient's history dates back to March 2013 when he presented with abdominal pain and was found to have liver metastases. The biopsy revealed well differentiated neuroendocrine tumor. He was found to have bone involvement at that time. His treatment consisted of monthly injections of Sandostatin. In April 2015 patient developed increasing abdominal pain and was found to have a bowel obstruction requiring small bowel resection. The pathology again revealed well-differentiated neuroendocrine tumor which is low grade involving the jejunum. The tumor measured 1.2 cm. Margins were negative. There was metastases in one of 3 lymph nodes.\par \par The patient then was well until March 2018 when he developed neck pain and was found to have an intramedullary lesion in C6-7. This required radiation to the spine in 10 fractions He subsequently developed myelopathy requiring IV steroids. In June 2018 he was hospitalized because of decreasing neurologic function during a steroid taper. In October 2018 he was hospitalized in [facility] tdue to septic shock, atrial fibrillation and altered mental status. He also developed cellulitis and retroperitoneal hematoma. In December 2018 he developed increasing shortness of breath due to a left pleural effusion which was negative on cytology.\par \par The patient subsequently has been stable and maintained on monthly injections of Sandostatin 30 mg which he tolerates well. At the present time his condition and disease has been controlled. However he does have occasional arm and leg tingling and has undergone neurologic followup. The patient currently is in rehabilitation undergoing physical therapy. He has some left leg movement but otherwise is hemiplegic. The patient has been off all steroids for about one year. He has not demonstrated signs or symptoms of carcinoid syndrome. He does note intermittent muscle-type pain in his back, left wrist and left upper extremity. The patient has been on several toe which was started one year ago for a blood clot in the leg. He has undergone recent CAT scans of the chest abdomen and pelvis which have basically shown stable disease. He is currently transferring his medical care to a change in insurance.\par \par  [AJCC Stage: ____] : AJCC Stage: [unfilled] [de-identified] : well diff NET

## 2019-12-20 NOTE — HISTORY OF PRESENT ILLNESS
[Disease: _____________________] : Disease: [unfilled] [AJCC Stage: ____] : AJCC Stage: [unfilled] [M: ___] : M[unfilled] [de-identified] : This is a 59-year-old man with history of neuroendocrine tumor arising from the small bowel. The patient's history dates back to March 2013 when he presented with abdominal pain and was found to have liver metastases. The biopsy revealed well differentiated neuroendocrine tumor. He was found to have bone involvement at that time. His treatment consisted of monthly injections of Sandostatin. In April 2015 patient developed increasing abdominal pain and was found to have a bowel obstruction requiring small bowel resection. The pathology again revealed well-differentiated neuroendocrine tumor which is low grade involving the jejunum. The tumor measured 1.2 cm. Margins were negative. There was metastases in one of 3 lymph nodes.\par \par The patient then was well until March 2018 when he developed neck pain and was found to have an intramedullary lesion in C6-7. This required radiation to the spine in 10 fractions He subsequently developed myelopathy requiring IV steroids. In June 2018 he was hospitalized because of decreasing neurologic function during a steroid taper. In October 2018 he was hospitalized in [facility] tdue to septic shock, atrial fibrillation and altered mental status. He also developed cellulitis and retroperitoneal hematoma. In December 2018 he developed increasing shortness of breath due to a left pleural effusion which was negative on cytology.\par \par The patient subsequently has been stable and maintained on monthly injections of Sandostatin 30 mg which he tolerates well. At the present time his condition and disease has been controlled. However he does have occasional arm and leg tingling and has undergone neurologic followup. The patient currently is in rehabilitation undergoing physical therapy. He has some left leg movement but otherwise is hemiplegic. The patient has been off all steroids for about one year. He has not demonstrated signs or symptoms of carcinoid syndrome. He does note intermittent muscle-type pain in his back, left wrist and left upper extremity. The patient has been on several toe which was started one year ago for a blood clot in the leg. He has undergone recent CAT scans of the chest abdomen and pelvis which have basically shown stable disease. He is currently transferring his medical care to a change in insurance.\par \par  [de-identified] : well diff NET

## 2020-01-07 ENCOUNTER — RESULT REVIEW (OUTPATIENT)
Age: 60
End: 2020-01-07

## 2020-01-07 PROCEDURE — 88321 CONSLTJ&REPRT SLD PREP ELSWR: CPT

## 2020-01-15 ENCOUNTER — OUTPATIENT (OUTPATIENT)
Dept: OUTPATIENT SERVICES | Facility: HOSPITAL | Age: 60
LOS: 1 days | Discharge: HOME | End: 2020-01-15

## 2020-01-15 DIAGNOSIS — Z90.49 ACQUIRED ABSENCE OF OTHER SPECIFIED PARTS OF DIGESTIVE TRACT: Chronic | ICD-10-CM

## 2020-01-15 DIAGNOSIS — C71.9 MALIGNANT NEOPLASM OF BRAIN, UNSPECIFIED: ICD-10-CM

## 2020-01-21 ENCOUNTER — APPOINTMENT (OUTPATIENT)
Dept: HEMATOLOGY ONCOLOGY | Facility: CLINIC | Age: 60
End: 2020-01-21
Payer: MEDICAID

## 2020-01-21 ENCOUNTER — APPOINTMENT (OUTPATIENT)
Dept: INFUSION THERAPY | Facility: HOSPITAL | Age: 60
End: 2020-01-21

## 2020-01-21 ENCOUNTER — RESULT REVIEW (OUTPATIENT)
Age: 60
End: 2020-01-21

## 2020-01-21 VITALS
SYSTOLIC BLOOD PRESSURE: 119 MMHG | OXYGEN SATURATION: 96 % | RESPIRATION RATE: 16 BRPM | TEMPERATURE: 97.7 F | HEART RATE: 56 BPM | DIASTOLIC BLOOD PRESSURE: 76 MMHG

## 2020-01-21 LAB
BASOPHILS # BLD AUTO: 0 K/UL — SIGNIFICANT CHANGE UP (ref 0–0.2)
BASOPHILS NFR BLD AUTO: 0.7 % — SIGNIFICANT CHANGE UP (ref 0–2)
EOSINOPHIL # BLD AUTO: 0.2 K/UL — SIGNIFICANT CHANGE UP (ref 0–0.5)
EOSINOPHIL NFR BLD AUTO: 2.6 % — SIGNIFICANT CHANGE UP (ref 0–6)
HCT VFR BLD CALC: 41.3 % — SIGNIFICANT CHANGE UP (ref 39–50)
HGB BLD-MCNC: 13.5 G/DL — SIGNIFICANT CHANGE UP (ref 13–17)
LYMPHOCYTES # BLD AUTO: 1.1 K/UL — SIGNIFICANT CHANGE UP (ref 1–3.3)
LYMPHOCYTES # BLD AUTO: 16.8 % — SIGNIFICANT CHANGE UP (ref 13–44)
MCHC RBC-ENTMCNC: 27.6 PG — SIGNIFICANT CHANGE UP (ref 27–34)
MCHC RBC-ENTMCNC: 32.6 G/DL — SIGNIFICANT CHANGE UP (ref 32–36)
MCV RBC AUTO: 84.5 FL — SIGNIFICANT CHANGE UP (ref 80–100)
MONOCYTES # BLD AUTO: 0.4 K/UL — SIGNIFICANT CHANGE UP (ref 0–0.9)
MONOCYTES NFR BLD AUTO: 6.6 % — SIGNIFICANT CHANGE UP (ref 2–14)
NEUTROPHILS # BLD AUTO: 4.7 K/UL — SIGNIFICANT CHANGE UP (ref 1.8–7.4)
NEUTROPHILS NFR BLD AUTO: 73.3 % — SIGNIFICANT CHANGE UP (ref 43–77)
PLATELET # BLD AUTO: 217 K/UL — SIGNIFICANT CHANGE UP (ref 150–400)
RBC # BLD: 4.89 M/UL — SIGNIFICANT CHANGE UP (ref 4.2–5.8)
RBC # FLD: 13.4 % — SIGNIFICANT CHANGE UP (ref 10.3–14.5)
WBC # BLD: 6.4 K/UL — SIGNIFICANT CHANGE UP (ref 3.8–10.5)
WBC # FLD AUTO: 6.4 K/UL — SIGNIFICANT CHANGE UP (ref 3.8–10.5)

## 2020-01-21 PROCEDURE — 99214 OFFICE O/P EST MOD 30 MIN: CPT

## 2020-01-21 NOTE — HISTORY OF PRESENT ILLNESS
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 The patient's history dates back to March 2013 when he presented with abdominal pain and was found to have liver metastases. The biopsy revealed well differentiated neuroendocrine tumor. He was found to have bone involvement at that time. His treatment consisted of monthly injections of Sandostatin. In April 2015 patient developed increasing abdominal pain and was found to have a bowel obstruction requiring small bowel resection. The pathology again revealed well-differentiated neuroendocrine tumor which is low grade involving the jejunum. The tumor measured 1.2 cm. Margins were negative. There was metastases in one of 3 lymph nodes.\par \par The patient then was well until March 2018 when he developed neck pain and was found to have an intramedullary lesion in C6-7. This required radiation to the spine in 10 fractions He subsequently developed myelopathy requiring IV steroids. In June 2018 he was hospitalized because of decreasing neurologic function during a steroid taper. In October 2018 he was hospitalized in [facility] tdue to septic shock, atrial fibrillation and altered mental status. He also developed cellulitis and retroperitoneal hematoma. In December 2018 he developed increasing shortness of breath due to a left pleural effusion which was negative on cytology.\par \par The patient subsequently has been stable and maintained on monthly injections of Sandostatin 30 mg which he tolerates well. At the present time his condition and disease has been controlled. However he does have occasional arm and leg tingling and has undergone neurologic followup. The patient currently is in rehabilitation undergoing physical therapy. He has some left leg movement but otherwise is hemiplegic. The patient has been off all steroids for about one year. He has not demonstrated signs or symptoms of carcinoid syndrome. He does note intermittent muscle-type pain in his back, left wrist and left upper extremity. The patient has been on several toe which was started one year ago for a blood clot in the leg. He has undergone recent CAT scans of the chest abdomen and pelvis which have basically shown stable disease. He is currently transferring his medical care to a change in insurance.\par \par  [de-identified] : well diff NET [de-identified] : Since the last visit the pt remains well and has no sym[ptoms indicating progressive disaease.

## 2020-01-21 NOTE — ASSESSMENT
[FreeTextEntry1] : The pt to continue on sandostatin LA  abd be followed for side effects and toxicity.Pt to repeat Ct and MRI scans in April. Pt remains in rehab as he remains paraplegic post bone metastases to spine and RT. pt to repeat blood testing. Will ask Rehab to do PT for leg strengthening exercises. Pt to RTO in 2 months for exam and monthly for octreotide.  [Palliative Care Plan] : not applicable at this time [Palliative] : Goals of care discussed with patient: Palliative

## 2020-01-22 ENCOUNTER — TRANSCRIPTION ENCOUNTER (OUTPATIENT)
Age: 60
End: 2020-01-22

## 2020-01-26 LAB
ALBUMIN SERPL ELPH-MCNC: 4.2 G/DL
ALP BLD-CCNC: 71 U/L
ALT SERPL-CCNC: 9 U/L
ANION GAP SERPL CALC-SCNC: 12 MMOL/L
AST SERPL-CCNC: 13 U/L
BILIRUB SERPL-MCNC: 0.3 MG/DL
BUN SERPL-MCNC: 13 MG/DL
CALCIUM SERPL-MCNC: 9.4 MG/DL
CEA SERPL-MCNC: 1.9 NG/ML
CGA SERPL-MCNC: 1418 NG/ML
CHLORIDE SERPL-SCNC: 104 MMOL/L
CO2 SERPL-SCNC: 26 MMOL/L
CREAT SERPL-MCNC: 0.76 MG/DL
GLUCOSE SERPL-MCNC: 99 MG/DL
POTASSIUM SERPL-SCNC: 4.6 MMOL/L
PROT SERPL-MCNC: 6.4 G/DL
SODIUM SERPL-SCNC: 141 MMOL/L

## 2020-02-12 ENCOUNTER — OUTPATIENT (OUTPATIENT)
Dept: OUTPATIENT SERVICES | Facility: HOSPITAL | Age: 60
LOS: 1 days | Discharge: HOME | End: 2020-02-12

## 2020-02-12 DIAGNOSIS — C71.9 MALIGNANT NEOPLASM OF BRAIN, UNSPECIFIED: ICD-10-CM

## 2020-02-12 DIAGNOSIS — Z90.49 ACQUIRED ABSENCE OF OTHER SPECIFIED PARTS OF DIGESTIVE TRACT: Chronic | ICD-10-CM

## 2020-02-18 ENCOUNTER — APPOINTMENT (OUTPATIENT)
Dept: INFUSION THERAPY | Facility: HOSPITAL | Age: 60
End: 2020-02-18

## 2020-02-19 DIAGNOSIS — D3A.8 OTHER BENIGN NEUROENDOCRINE TUMORS: ICD-10-CM

## 2020-03-11 ENCOUNTER — OUTPATIENT (OUTPATIENT)
Dept: OUTPATIENT SERVICES | Facility: HOSPITAL | Age: 60
LOS: 1 days | Discharge: HOME | End: 2020-03-11

## 2020-03-11 DIAGNOSIS — Z90.49 ACQUIRED ABSENCE OF OTHER SPECIFIED PARTS OF DIGESTIVE TRACT: Chronic | ICD-10-CM

## 2020-03-11 DIAGNOSIS — C71.9 MALIGNANT NEOPLASM OF BRAIN, UNSPECIFIED: ICD-10-CM

## 2020-03-17 ENCOUNTER — RESULT REVIEW (OUTPATIENT)
Age: 60
End: 2020-03-17

## 2020-03-17 ENCOUNTER — APPOINTMENT (OUTPATIENT)
Dept: INFUSION THERAPY | Facility: HOSPITAL | Age: 60
End: 2020-03-17

## 2020-03-17 ENCOUNTER — APPOINTMENT (OUTPATIENT)
Dept: HEMATOLOGY ONCOLOGY | Facility: CLINIC | Age: 60
End: 2020-03-17
Payer: MEDICAID

## 2020-03-17 VITALS
RESPIRATION RATE: 16 BRPM | DIASTOLIC BLOOD PRESSURE: 71 MMHG | TEMPERATURE: 98.1 F | BODY MASS INDEX: 22.81 KG/M2 | WEIGHT: 150 LBS | SYSTOLIC BLOOD PRESSURE: 110 MMHG | HEART RATE: 63 BPM | OXYGEN SATURATION: 97 %

## 2020-03-17 LAB
BASOPHILS # BLD AUTO: 0.05 K/UL — SIGNIFICANT CHANGE UP (ref 0–0.2)
BASOPHILS NFR BLD AUTO: 0.9 % — SIGNIFICANT CHANGE UP (ref 0–2)
EOSINOPHIL # BLD AUTO: 0.14 K/UL — SIGNIFICANT CHANGE UP (ref 0–0.5)
EOSINOPHIL NFR BLD AUTO: 2.5 % — SIGNIFICANT CHANGE UP (ref 0–6)
HCT VFR BLD CALC: 40.9 % — SIGNIFICANT CHANGE UP (ref 39–50)
HGB BLD-MCNC: 13 G/DL — SIGNIFICANT CHANGE UP (ref 13–17)
IMM GRANULOCYTES NFR BLD AUTO: 0.4 % — SIGNIFICANT CHANGE UP (ref 0–1.5)
LYMPHOCYTES # BLD AUTO: 1 K/UL — SIGNIFICANT CHANGE UP (ref 1–3.3)
LYMPHOCYTES # BLD AUTO: 17.7 % — SIGNIFICANT CHANGE UP (ref 13–44)
MCHC RBC-ENTMCNC: 26.7 PG — LOW (ref 27–34)
MCHC RBC-ENTMCNC: 31.8 GM/DL — LOW (ref 32–36)
MCV RBC AUTO: 84.2 FL — SIGNIFICANT CHANGE UP (ref 80–100)
MONOCYTES # BLD AUTO: 0.37 K/UL — SIGNIFICANT CHANGE UP (ref 0–0.9)
MONOCYTES NFR BLD AUTO: 6.5 % — SIGNIFICANT CHANGE UP (ref 2–14)
NEUTROPHILS # BLD AUTO: 4.08 K/UL — SIGNIFICANT CHANGE UP (ref 1.8–7.4)
NEUTROPHILS NFR BLD AUTO: 72 % — SIGNIFICANT CHANGE UP (ref 43–77)
NRBC # BLD: 0 /100 WBCS — SIGNIFICANT CHANGE UP (ref 0–0)
PLATELET # BLD AUTO: 232 K/UL — SIGNIFICANT CHANGE UP (ref 150–400)
RBC # BLD: 4.86 M/UL — SIGNIFICANT CHANGE UP (ref 4.2–5.8)
RBC # FLD: 13.6 % — SIGNIFICANT CHANGE UP (ref 10.3–14.5)
WBC # BLD: 5.66 K/UL — SIGNIFICANT CHANGE UP (ref 3.8–10.5)
WBC # FLD AUTO: 5.66 K/UL — SIGNIFICANT CHANGE UP (ref 3.8–10.5)

## 2020-03-17 PROCEDURE — 99214 OFFICE O/P EST MOD 30 MIN: CPT

## 2020-03-18 DIAGNOSIS — D3A.8 OTHER BENIGN NEUROENDOCRINE TUMORS: ICD-10-CM

## 2020-03-19 LAB
ALBUMIN SERPL ELPH-MCNC: 4.3 G/DL
ALP BLD-CCNC: 70 U/L
ALT SERPL-CCNC: 12 U/L
ANION GAP SERPL CALC-SCNC: 13 MMOL/L
AST SERPL-CCNC: 14 U/L
BILIRUB SERPL-MCNC: 0.3 MG/DL
BUN SERPL-MCNC: 17 MG/DL
CALCIUM SERPL-MCNC: 9.1 MG/DL
CEA SERPL-MCNC: 2.1 NG/ML
CGA SERPL-MCNC: 1420 NG/ML
CHLORIDE SERPL-SCNC: 104 MMOL/L
CO2 SERPL-SCNC: 23 MMOL/L
CREAT SERPL-MCNC: 0.73 MG/DL
GLUCOSE SERPL-MCNC: 108 MG/DL
POTASSIUM SERPL-SCNC: 4.4 MMOL/L
PROT SERPL-MCNC: 6.5 G/DL
SODIUM SERPL-SCNC: 140 MMOL/L

## 2020-03-19 NOTE — PHYSICAL EXAM
[Normal] : affect appropriate [de-identified] : Continued weakness and paralysis in  both lower extremities and is confined to the wheelchair.

## 2020-03-19 NOTE — HISTORY OF PRESENT ILLNESS
Chari Rod is a 14 y.o. female admitted to [facility] on 2/27/2020 for Genu valgum, right, underwent R femur osteotomy. Chari Rod tolerated evaluation well today. Educated pt on RLE PWB precautions (up to 50% weightbearing on RLE), verbalized understanding of precautions as well as maintaining hinged knee brace in extension until post-op appointment with Dr. Shannon. She has prior experience using crutches so required very minimal cueing for safety with using device. Ambulates 30 ft with crutches and therapist supervision, displaces very minimal weight onto RLE when walking (easily less than 50% weight). Able to sit <> stand from bed with crutches as well as transition in/out of bed without difficulty. Educated on car transfer safety (I.e. Enter from back passenger side, sit behind  with RLE propped across seats for ride home). Discussed PT role, continued mobility and recommendations (Home with family) with patient and mom; plan to follow-up with Dr. Shannon in two weeks then start CPM (for knee ROM) as well as outpatient PT, family verbalized understanding. At this time, Chari Rod has no further acute PT needs, safe to discharge once cleared by orthopedic team. Will now d/c from acute PT services.    Problem: Physical Therapy Goal  Goal: Physical Therapy Goal  Description  Pt has met all acute PT goals (see below), will now discharge from PT services.    1. Chari will demo ability to ambulate household distances with crutches without therapist assistance - MET (2/28)  2. Chari will verbalize understanding of RLE PWB precautions - MET (2/28)   Outcome: Met    Kartik Joe, PT  2/28/2020   [Disease: _____________________] : Disease: [unfilled] [M: ___] : M[unfilled] [AJCC Stage: ____] : AJCC Stage: [unfilled] [de-identified] : This is a 59-year-old man with history of neuroendocrine tumor arising from the small bowel. The patient's history dates back to March 2013 when he presented with abdominal pain and was found to have liver metastases. The biopsy revealed well differentiated neuroendocrine tumor. He was found to have bone involvement at that time. His treatment consisted of monthly injections of Sandostatin. In April 2015 patient developed increasing abdominal pain and was found to have a bowel obstruction requiring small bowel resection. The pathology again revealed well-differentiated neuroendocrine tumor which is low grade involving the jejunum. The tumor measured 1.2 cm. Margins were negative. There was metastases in one of 3 lymph nodes.\par \par The patient then was well until March 2018 when he developed neck pain and was found to have an intramedullary lesion in C6-7. This required radiation to the spine in 10 fractions He subsequently developed myelopathy requiring IV steroids. In June 2018 he was hospitalized because of decreasing neurologic function during a steroid taper. In October 2018 he was hospitalized in [facility] tdue to septic shock, atrial fibrillation and altered mental status. He also developed cellulitis and retroperitoneal hematoma. In December 2018 he developed increasing shortness of breath due to a left pleural effusion which was negative on cytology.\par \par The patient subsequently has been stable and maintained on monthly injections of Sandostatin 30 mg which he tolerates well. At the present time his condition and disease has been controlled. However he does have occasional arm and leg tingling and has undergone neurologic followup. The patient currently is in rehabilitation undergoing physical therapy. He has some left leg movement but otherwise is hemiplegic. The patient has been off all steroids for about one year. He has not demonstrated signs or symptoms of carcinoid syndrome. He does note intermittent muscle-type pain in his back, left wrist and left upper extremity. The patient has been on several toe which was started one year ago for a blood clot in the leg. He has undergone recent CAT scans of the chest abdomen and pelvis which have basically shown stable disease. He is currently transferring his medical care to a change in insurance.\par \par  [de-identified] : well diff NET [de-identified] : Since he last visit the patient remains in a rehabilitation center although he is able to get only limited physical therapy. His condition overall is stable and he has no new symptoms indicating progressive disease.

## 2020-03-19 NOTE — ASSESSMENT
[FreeTextEntry1] : Overall the patient's disease remains stable and we will continue monitoring with blood testing and repeated CAT scans to assess for  increased activity of his disease. He will continue his treatment with his monthly injections of Sandostatin and he will be followed for side effects and toxicity. At the present time he has no symptoms indicating carcinoid syndrome. He'll return in one month or sooner as needed [Palliative] : Goals of care discussed with patient: Palliative [Palliative Care Plan] : not applicable at this time

## 2020-04-07 ENCOUNTER — OUTPATIENT (OUTPATIENT)
Dept: OUTPATIENT SERVICES | Facility: HOSPITAL | Age: 60
LOS: 1 days | Discharge: HOME | End: 2020-04-07

## 2020-04-07 DIAGNOSIS — C71.9 MALIGNANT NEOPLASM OF BRAIN, UNSPECIFIED: ICD-10-CM

## 2020-04-07 DIAGNOSIS — Z90.49 ACQUIRED ABSENCE OF OTHER SPECIFIED PARTS OF DIGESTIVE TRACT: Chronic | ICD-10-CM

## 2020-04-13 ENCOUNTER — OUTPATIENT (OUTPATIENT)
Dept: OUTPATIENT SERVICES | Facility: HOSPITAL | Age: 60
LOS: 1 days | End: 2020-04-13
Payer: MEDICAID

## 2020-04-13 ENCOUNTER — APPOINTMENT (OUTPATIENT)
Dept: INFUSION THERAPY | Facility: HOSPITAL | Age: 60
End: 2020-04-13

## 2020-04-13 ENCOUNTER — APPOINTMENT (OUTPATIENT)
Dept: CT IMAGING | Facility: IMAGING CENTER | Age: 60
End: 2020-04-13

## 2020-04-13 DIAGNOSIS — Z90.49 ACQUIRED ABSENCE OF OTHER SPECIFIED PARTS OF DIGESTIVE TRACT: Chronic | ICD-10-CM

## 2020-04-13 DIAGNOSIS — D3A.8 OTHER BENIGN NEUROENDOCRINE TUMORS: ICD-10-CM

## 2020-04-13 PROCEDURE — 71260 CT THORAX DX C+: CPT

## 2020-04-13 PROCEDURE — 71260 CT THORAX DX C+: CPT | Mod: 26

## 2020-04-13 PROCEDURE — 74177 CT ABD & PELVIS W/CONTRAST: CPT

## 2020-04-13 PROCEDURE — 74177 CT ABD & PELVIS W/CONTRAST: CPT | Mod: 26

## 2020-04-14 DIAGNOSIS — C7A.8 OTHER MALIGNANT NEUROENDOCRINE TUMORS: ICD-10-CM

## 2020-04-20 ENCOUNTER — RESULT REVIEW (OUTPATIENT)
Age: 60
End: 2020-04-20

## 2020-04-20 ENCOUNTER — APPOINTMENT (OUTPATIENT)
Dept: MRI IMAGING | Facility: CLINIC | Age: 60
End: 2020-04-20
Payer: MEDICAID

## 2020-04-20 ENCOUNTER — APPOINTMENT (OUTPATIENT)
Dept: MRI IMAGING | Facility: CLINIC | Age: 60
End: 2020-04-20

## 2020-04-20 ENCOUNTER — OUTPATIENT (OUTPATIENT)
Dept: OUTPATIENT SERVICES | Facility: HOSPITAL | Age: 60
LOS: 1 days | End: 2020-04-20
Payer: MEDICAID

## 2020-04-20 DIAGNOSIS — D3A.8 OTHER BENIGN NEUROENDOCRINE TUMORS: ICD-10-CM

## 2020-04-20 DIAGNOSIS — C79.51 SECONDARY MALIGNANT NEOPLASM OF BONE: ICD-10-CM

## 2020-04-20 DIAGNOSIS — G95.20 UNSPECIFIED CORD COMPRESSION: ICD-10-CM

## 2020-04-20 DIAGNOSIS — Z90.49 ACQUIRED ABSENCE OF OTHER SPECIFIED PARTS OF DIGESTIVE TRACT: Chronic | ICD-10-CM

## 2020-04-20 PROCEDURE — 72157 MRI CHEST SPINE W/O & W/DYE: CPT

## 2020-04-20 PROCEDURE — 72158 MRI LUMBAR SPINE W/O & W/DYE: CPT

## 2020-04-20 PROCEDURE — 72158 MRI LUMBAR SPINE W/O & W/DYE: CPT | Mod: 26

## 2020-04-20 PROCEDURE — 72157 MRI CHEST SPINE W/O & W/DYE: CPT | Mod: 26

## 2020-04-20 PROCEDURE — 72156 MRI NECK SPINE W/O & W/DYE: CPT

## 2020-04-20 PROCEDURE — A9585: CPT

## 2020-04-20 PROCEDURE — 72156 MRI NECK SPINE W/O & W/DYE: CPT | Mod: 26

## 2020-05-08 ENCOUNTER — OUTPATIENT (OUTPATIENT)
Dept: OUTPATIENT SERVICES | Facility: HOSPITAL | Age: 60
LOS: 1 days | Discharge: HOME | End: 2020-05-08

## 2020-05-08 DIAGNOSIS — Z90.49 ACQUIRED ABSENCE OF OTHER SPECIFIED PARTS OF DIGESTIVE TRACT: Chronic | ICD-10-CM

## 2020-05-08 DIAGNOSIS — C71.9 MALIGNANT NEOPLASM OF BRAIN, UNSPECIFIED: ICD-10-CM

## 2020-05-13 ENCOUNTER — RESULT REVIEW (OUTPATIENT)
Age: 60
End: 2020-05-13

## 2020-05-13 ENCOUNTER — APPOINTMENT (OUTPATIENT)
Dept: HEMATOLOGY ONCOLOGY | Facility: CLINIC | Age: 60
End: 2020-05-13
Payer: MEDICAID

## 2020-05-13 ENCOUNTER — APPOINTMENT (OUTPATIENT)
Dept: INFUSION THERAPY | Facility: HOSPITAL | Age: 60
End: 2020-05-13

## 2020-05-13 LAB
BASOPHILS # BLD AUTO: 0.04 K/UL — SIGNIFICANT CHANGE UP (ref 0–0.2)
BASOPHILS NFR BLD AUTO: 0.9 % — SIGNIFICANT CHANGE UP (ref 0–2)
EOSINOPHIL # BLD AUTO: 0.17 K/UL — SIGNIFICANT CHANGE UP (ref 0–0.5)
EOSINOPHIL NFR BLD AUTO: 3.8 % — SIGNIFICANT CHANGE UP (ref 0–6)
HCT VFR BLD CALC: 40.5 % — SIGNIFICANT CHANGE UP (ref 39–50)
HGB BLD-MCNC: 13.1 G/DL — SIGNIFICANT CHANGE UP (ref 13–17)
IMM GRANULOCYTES NFR BLD AUTO: 0.2 % — SIGNIFICANT CHANGE UP (ref 0–1.5)
LYMPHOCYTES # BLD AUTO: 0.78 K/UL — LOW (ref 1–3.3)
LYMPHOCYTES # BLD AUTO: 17.5 % — SIGNIFICANT CHANGE UP (ref 13–44)
MCHC RBC-ENTMCNC: 26.5 PG — LOW (ref 27–34)
MCHC RBC-ENTMCNC: 32.3 GM/DL — SIGNIFICANT CHANGE UP (ref 32–36)
MCV RBC AUTO: 82 FL — SIGNIFICANT CHANGE UP (ref 80–100)
MONOCYTES # BLD AUTO: 0.28 K/UL — SIGNIFICANT CHANGE UP (ref 0–0.9)
MONOCYTES NFR BLD AUTO: 6.3 % — SIGNIFICANT CHANGE UP (ref 2–14)
NEUTROPHILS # BLD AUTO: 3.17 K/UL — SIGNIFICANT CHANGE UP (ref 1.8–7.4)
NEUTROPHILS NFR BLD AUTO: 71.3 % — SIGNIFICANT CHANGE UP (ref 43–77)
NRBC # BLD: 0 /100 WBCS — SIGNIFICANT CHANGE UP (ref 0–0)
PLATELET # BLD AUTO: 234 K/UL — SIGNIFICANT CHANGE UP (ref 150–400)
RBC # BLD: 4.94 M/UL — SIGNIFICANT CHANGE UP (ref 4.2–5.8)
RBC # FLD: 13.9 % — SIGNIFICANT CHANGE UP (ref 10.3–14.5)
WBC # BLD: 4.45 K/UL — SIGNIFICANT CHANGE UP (ref 3.8–10.5)
WBC # FLD AUTO: 4.45 K/UL — SIGNIFICANT CHANGE UP (ref 3.8–10.5)

## 2020-05-13 PROCEDURE — 99213 OFFICE O/P EST LOW 20 MIN: CPT | Mod: 95

## 2020-05-14 DIAGNOSIS — C7A.8 OTHER MALIGNANT NEUROENDOCRINE TUMORS: ICD-10-CM

## 2020-05-14 LAB
ALBUMIN SERPL ELPH-MCNC: 4.1 G/DL
ALP BLD-CCNC: 75 U/L
ALT SERPL-CCNC: 14 U/L
ANION GAP SERPL CALC-SCNC: 11 MMOL/L
AST SERPL-CCNC: 16 U/L
BILIRUB SERPL-MCNC: 0.2 MG/DL
BUN SERPL-MCNC: 11 MG/DL
CALCIUM SERPL-MCNC: 9.2 MG/DL
CEA SERPL-MCNC: 1.6 NG/ML
CHLORIDE SERPL-SCNC: 103 MMOL/L
CO2 SERPL-SCNC: 28 MMOL/L
CREAT SERPL-MCNC: 0.77 MG/DL
GLUCOSE SERPL-MCNC: 108 MG/DL
POTASSIUM SERPL-SCNC: 4.2 MMOL/L
PROT SERPL-MCNC: 6 G/DL
SODIUM SERPL-SCNC: 142 MMOL/L

## 2020-05-15 LAB — CGA SERPL-MCNC: 1321 NG/ML

## 2020-05-18 LAB — SEROTONIN SERUM: 1126 NG/ML

## 2020-05-27 NOTE — HISTORY OF PRESENT ILLNESS
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 The patient's history dates back to March 2013 when he presented with abdominal pain and was found to have liver metastases. The biopsy revealed well differentiated neuroendocrine tumor. He was found to have bone involvement at that time. His treatment consisted of monthly injections of Sandostatin. In April 2015 patient developed increasing abdominal pain and was found to have a bowel obstruction requiring small bowel resection. The pathology again revealed well-differentiated neuroendocrine tumor which is low grade involving the jejunum. The tumor measured 1.2 cm. Margins were negative. There was metastases in one of 3 lymph nodes.\par \par The patient then was well until March 2018 when he developed neck pain and was found to have an intramedullary lesion in C6-7. This required radiation to the spine in 10 fractions He subsequently developed myelopathy requiring IV steroids. In June 2018 he was hospitalized because of decreasing neurologic function during a steroid taper. In October 2018 he was hospitalized in [facility] tdue to septic shock, atrial fibrillation and altered mental status. He also developed cellulitis and retroperitoneal hematoma. In December 2018 he developed increasing shortness of breath due to a left pleural effusion which was negative on cytology.\par \par The patient subsequently has been stable and maintained on monthly injections of Sandostatin 30 mg which he tolerates well. At the present time his condition and disease has been controlled. However he does have occasional arm and leg tingling and has undergone neurologic followup. The patient currently is in rehabilitation undergoing physical therapy. He has some left leg movement but otherwise is hemiplegic. The patient has been off all steroids for about one year. He has not demonstrated signs or symptoms of carcinoid syndrome. He does note intermittent muscle-type pain in his back, left wrist and left upper extremity. The patient has been on several toe which was started one year ago for a blood clot in the leg. He has undergone recent CAT scans of the chest abdomen and pelvis which have basically shown stable disease. He is currently transferring his medical care to a change in insurance.\par \par  [de-identified] : well diff NET [de-identified] : Since the last visit patient overall remains stable.He continues therapy in the rehabilitation center at has continued back tightness but no change in his symptoms the patient underwent followup CAT scans and MRIs which overall show stable bone metastases and abdominal metastases. He continues on treatment with octreotide which she tolerates well.

## 2020-05-27 NOTE — REVIEW OF SYSTEMS
[Constipation] : constipation [Negative] : Allergic/Immunologic [FreeTextEntry4] : As postnasal drip and sinus allergies. [FreeTextEntry7] : continues on laxatives. [FreeTextEntry9] : Has chronic pain in the leg muscles and remains paraplegic patient has pain in the left foot and chronic low back pain.

## 2020-05-27 NOTE — REASON FOR VISIT
[Home] : at home, [unfilled] , at the time of the visit. [Medical Office: ([facility])___] : at the medical office located in  [FreeTextEntry2] : NET

## 2020-05-27 NOTE — ASSESSMENT
[Palliative] : Goals of care discussed with patient: Palliative [FreeTextEntry1] : The patient will continue his Sandostatin injections and be followed for side effects and toxicity. He'll also be monitored for signs and symptoms of progression of his disease. Currently his findings indicate stable disease and he will undergo followup CAT scans and blood testing to assess for response or progression of disease. He will return in one month or sooner as needed. [Palliative Care Plan] : not applicable at this time

## 2020-06-09 ENCOUNTER — OUTPATIENT (OUTPATIENT)
Dept: OUTPATIENT SERVICES | Facility: HOSPITAL | Age: 60
LOS: 1 days | Discharge: HOME | End: 2020-06-09

## 2020-06-09 DIAGNOSIS — Z90.49 ACQUIRED ABSENCE OF OTHER SPECIFIED PARTS OF DIGESTIVE TRACT: Chronic | ICD-10-CM

## 2020-06-09 DIAGNOSIS — C71.9 MALIGNANT NEOPLASM OF BRAIN, UNSPECIFIED: ICD-10-CM

## 2020-06-12 ENCOUNTER — APPOINTMENT (OUTPATIENT)
Dept: INFUSION THERAPY | Facility: HOSPITAL | Age: 60
End: 2020-06-12

## 2020-06-15 DIAGNOSIS — C7A.8 OTHER MALIGNANT NEUROENDOCRINE TUMORS: ICD-10-CM

## 2020-07-08 ENCOUNTER — OUTPATIENT (OUTPATIENT)
Dept: OUTPATIENT SERVICES | Facility: HOSPITAL | Age: 60
LOS: 1 days | Discharge: HOME | End: 2020-07-08

## 2020-07-08 DIAGNOSIS — C71.9 MALIGNANT NEOPLASM OF BRAIN, UNSPECIFIED: ICD-10-CM

## 2020-07-08 DIAGNOSIS — Z90.49 ACQUIRED ABSENCE OF OTHER SPECIFIED PARTS OF DIGESTIVE TRACT: Chronic | ICD-10-CM

## 2020-07-13 ENCOUNTER — APPOINTMENT (OUTPATIENT)
Dept: INFUSION THERAPY | Facility: HOSPITAL | Age: 60
End: 2020-07-13

## 2020-07-14 DIAGNOSIS — C7A.8 OTHER MALIGNANT NEUROENDOCRINE TUMORS: ICD-10-CM

## 2020-08-11 ENCOUNTER — OUTPATIENT (OUTPATIENT)
Dept: OUTPATIENT SERVICES | Facility: HOSPITAL | Age: 60
LOS: 1 days | Discharge: HOME | End: 2020-08-11

## 2020-08-11 DIAGNOSIS — Z90.49 ACQUIRED ABSENCE OF OTHER SPECIFIED PARTS OF DIGESTIVE TRACT: Chronic | ICD-10-CM

## 2020-08-11 DIAGNOSIS — C71.9 MALIGNANT NEOPLASM OF BRAIN, UNSPECIFIED: ICD-10-CM

## 2020-08-13 ENCOUNTER — RESULT REVIEW (OUTPATIENT)
Age: 60
End: 2020-08-13

## 2020-08-13 ENCOUNTER — APPOINTMENT (OUTPATIENT)
Dept: HEMATOLOGY ONCOLOGY | Facility: CLINIC | Age: 60
End: 2020-08-13

## 2020-08-13 ENCOUNTER — APPOINTMENT (OUTPATIENT)
Dept: INFUSION THERAPY | Facility: HOSPITAL | Age: 60
End: 2020-08-13

## 2020-08-13 LAB
ALBUMIN SERPL ELPH-MCNC: 4.3 G/DL — SIGNIFICANT CHANGE UP (ref 3.3–5)
ALP SERPL-CCNC: 79 U/L — SIGNIFICANT CHANGE UP (ref 40–120)
ALT FLD-CCNC: 15 U/L — SIGNIFICANT CHANGE UP (ref 10–45)
ANION GAP SERPL CALC-SCNC: 10 MMOL/L — SIGNIFICANT CHANGE UP (ref 5–17)
AST SERPL-CCNC: 17 U/L — SIGNIFICANT CHANGE UP (ref 10–40)
BASOPHILS # BLD AUTO: 0.04 K/UL — SIGNIFICANT CHANGE UP (ref 0–0.2)
BASOPHILS NFR BLD AUTO: 0.7 % — SIGNIFICANT CHANGE UP (ref 0–2)
BILIRUB SERPL-MCNC: 0.3 MG/DL — SIGNIFICANT CHANGE UP (ref 0.2–1.2)
BUN SERPL-MCNC: 18 MG/DL — SIGNIFICANT CHANGE UP (ref 7–23)
CALCIUM SERPL-MCNC: 9 MG/DL — SIGNIFICANT CHANGE UP (ref 8.4–10.5)
CEA SERPL-MCNC: 1.8 NG/ML — SIGNIFICANT CHANGE UP (ref 0–3.8)
CHLORIDE SERPL-SCNC: 104 MMOL/L — SIGNIFICANT CHANGE UP (ref 96–108)
CO2 SERPL-SCNC: 24 MMOL/L — SIGNIFICANT CHANGE UP (ref 22–31)
CREAT SERPL-MCNC: 0.72 MG/DL — SIGNIFICANT CHANGE UP (ref 0.5–1.3)
EOSINOPHIL # BLD AUTO: 0.12 K/UL — SIGNIFICANT CHANGE UP (ref 0–0.5)
EOSINOPHIL NFR BLD AUTO: 2 % — SIGNIFICANT CHANGE UP (ref 0–6)
GLUCOSE SERPL-MCNC: 91 MG/DL — SIGNIFICANT CHANGE UP (ref 70–99)
HCT VFR BLD CALC: 41.6 % — SIGNIFICANT CHANGE UP (ref 39–50)
HGB BLD-MCNC: 13.1 G/DL — SIGNIFICANT CHANGE UP (ref 13–17)
IMM GRANULOCYTES NFR BLD AUTO: 0.5 % — SIGNIFICANT CHANGE UP (ref 0–1.5)
LYMPHOCYTES # BLD AUTO: 0.85 K/UL — LOW (ref 1–3.3)
LYMPHOCYTES # BLD AUTO: 14.5 % — SIGNIFICANT CHANGE UP (ref 13–44)
MCHC RBC-ENTMCNC: 26 PG — LOW (ref 27–34)
MCHC RBC-ENTMCNC: 31.5 GM/DL — LOW (ref 32–36)
MCV RBC AUTO: 82.5 FL — SIGNIFICANT CHANGE UP (ref 80–100)
MONOCYTES # BLD AUTO: 0.52 K/UL — SIGNIFICANT CHANGE UP (ref 0–0.9)
MONOCYTES NFR BLD AUTO: 8.9 % — SIGNIFICANT CHANGE UP (ref 2–14)
NEUTROPHILS # BLD AUTO: 4.3 K/UL — SIGNIFICANT CHANGE UP (ref 1.8–7.4)
NEUTROPHILS NFR BLD AUTO: 73.4 % — SIGNIFICANT CHANGE UP (ref 43–77)
NRBC # BLD: 0 /100 WBCS — SIGNIFICANT CHANGE UP (ref 0–0)
PLATELET # BLD AUTO: 249 K/UL — SIGNIFICANT CHANGE UP (ref 150–400)
POTASSIUM SERPL-MCNC: 4.7 MMOL/L — SIGNIFICANT CHANGE UP (ref 3.5–5.3)
POTASSIUM SERPL-SCNC: 4.7 MMOL/L — SIGNIFICANT CHANGE UP (ref 3.5–5.3)
PROT SERPL-MCNC: 6.1 G/DL — SIGNIFICANT CHANGE UP (ref 6–8.3)
RBC # BLD: 5.04 M/UL — SIGNIFICANT CHANGE UP (ref 4.2–5.8)
RBC # FLD: 15.2 % — HIGH (ref 10.3–14.5)
SODIUM SERPL-SCNC: 138 MMOL/L — SIGNIFICANT CHANGE UP (ref 135–145)
WBC # BLD: 5.86 K/UL — SIGNIFICANT CHANGE UP (ref 3.8–10.5)
WBC # FLD AUTO: 5.86 K/UL — SIGNIFICANT CHANGE UP (ref 3.8–10.5)

## 2020-08-16 DIAGNOSIS — G83.10 MONOPLEGIA OF LOWER LIMB AFFECTING UNSPECIFIED SIDE: ICD-10-CM

## 2020-08-17 LAB — CGA FLD-MCNC: 1330 NG/ML — HIGH

## 2020-08-19 LAB — SEROTONIN, SERUM: 1746 NG/ML — HIGH (ref 56–244)

## 2020-08-21 ENCOUNTER — APPOINTMENT (OUTPATIENT)
Dept: CT IMAGING | Facility: CLINIC | Age: 60
End: 2020-08-21
Payer: MEDICAID

## 2020-08-21 ENCOUNTER — OUTPATIENT (OUTPATIENT)
Dept: OUTPATIENT SERVICES | Facility: HOSPITAL | Age: 60
LOS: 1 days | End: 2020-08-21
Payer: MEDICAID

## 2020-08-21 DIAGNOSIS — Z90.49 ACQUIRED ABSENCE OF OTHER SPECIFIED PARTS OF DIGESTIVE TRACT: Chronic | ICD-10-CM

## 2020-08-21 DIAGNOSIS — D3A.8 OTHER BENIGN NEUROENDOCRINE TUMORS: ICD-10-CM

## 2020-08-21 PROCEDURE — 71260 CT THORAX DX C+: CPT

## 2020-08-21 PROCEDURE — 71260 CT THORAX DX C+: CPT | Mod: 26

## 2020-08-21 PROCEDURE — 74177 CT ABD & PELVIS W/CONTRAST: CPT | Mod: 26

## 2020-08-21 PROCEDURE — 74177 CT ABD & PELVIS W/CONTRAST: CPT

## 2020-08-25 ENCOUNTER — APPOINTMENT (OUTPATIENT)
Dept: HEMATOLOGY ONCOLOGY | Facility: CLINIC | Age: 60
End: 2020-08-25
Payer: MEDICAID

## 2020-08-25 PROCEDURE — 99441: CPT

## 2020-09-12 ENCOUNTER — OUTPATIENT (OUTPATIENT)
Dept: OUTPATIENT SERVICES | Facility: HOSPITAL | Age: 60
LOS: 1 days | Discharge: HOME | End: 2020-09-12

## 2020-09-12 DIAGNOSIS — Z90.49 ACQUIRED ABSENCE OF OTHER SPECIFIED PARTS OF DIGESTIVE TRACT: Chronic | ICD-10-CM

## 2020-09-12 DIAGNOSIS — C71.9 MALIGNANT NEOPLASM OF BRAIN, UNSPECIFIED: ICD-10-CM

## 2020-09-15 ENCOUNTER — APPOINTMENT (OUTPATIENT)
Dept: INFUSION THERAPY | Facility: HOSPITAL | Age: 60
End: 2020-09-15

## 2020-09-15 DIAGNOSIS — C7A.8 OTHER MALIGNANT NEUROENDOCRINE TUMORS: ICD-10-CM

## 2020-09-28 ENCOUNTER — APPOINTMENT (OUTPATIENT)
Dept: MRI IMAGING | Facility: CLINIC | Age: 60
End: 2020-09-28
Payer: MEDICAID

## 2020-09-28 ENCOUNTER — RESULT REVIEW (OUTPATIENT)
Age: 60
End: 2020-09-28

## 2020-09-28 ENCOUNTER — OUTPATIENT (OUTPATIENT)
Dept: OUTPATIENT SERVICES | Facility: HOSPITAL | Age: 60
LOS: 1 days | End: 2020-09-28
Payer: MEDICAID

## 2020-09-28 DIAGNOSIS — G95.20 UNSPECIFIED CORD COMPRESSION: ICD-10-CM

## 2020-09-28 DIAGNOSIS — Z90.49 ACQUIRED ABSENCE OF OTHER SPECIFIED PARTS OF DIGESTIVE TRACT: Chronic | ICD-10-CM

## 2020-09-28 PROCEDURE — 72156 MRI NECK SPINE W/O & W/DYE: CPT | Mod: 26

## 2020-09-28 PROCEDURE — 72157 MRI CHEST SPINE W/O & W/DYE: CPT | Mod: 26

## 2020-09-28 PROCEDURE — A9585: CPT

## 2020-09-28 PROCEDURE — 72158 MRI LUMBAR SPINE W/O & W/DYE: CPT

## 2020-09-28 PROCEDURE — 72158 MRI LUMBAR SPINE W/O & W/DYE: CPT | Mod: 26

## 2020-09-28 PROCEDURE — 72157 MRI CHEST SPINE W/O & W/DYE: CPT

## 2020-09-28 PROCEDURE — 72156 MRI NECK SPINE W/O & W/DYE: CPT

## 2020-10-13 ENCOUNTER — OUTPATIENT (OUTPATIENT)
Dept: OUTPATIENT SERVICES | Facility: HOSPITAL | Age: 60
LOS: 1 days | Discharge: HOME | End: 2020-10-13

## 2020-10-13 DIAGNOSIS — C71.9 MALIGNANT NEOPLASM OF BRAIN, UNSPECIFIED: ICD-10-CM

## 2020-10-13 DIAGNOSIS — Z90.49 ACQUIRED ABSENCE OF OTHER SPECIFIED PARTS OF DIGESTIVE TRACT: Chronic | ICD-10-CM

## 2020-10-15 ENCOUNTER — RESULT REVIEW (OUTPATIENT)
Age: 60
End: 2020-10-15

## 2020-10-15 ENCOUNTER — APPOINTMENT (OUTPATIENT)
Dept: INFUSION THERAPY | Facility: HOSPITAL | Age: 60
End: 2020-10-15

## 2020-10-15 LAB
BASOPHILS # BLD AUTO: 0.05 K/UL — SIGNIFICANT CHANGE UP (ref 0–0.2)
BASOPHILS NFR BLD AUTO: 0.9 % — SIGNIFICANT CHANGE UP (ref 0–2)
EOSINOPHIL # BLD AUTO: 0.1 K/UL — SIGNIFICANT CHANGE UP (ref 0–0.5)
EOSINOPHIL NFR BLD AUTO: 1.7 % — SIGNIFICANT CHANGE UP (ref 0–6)
HCT VFR BLD CALC: 42.9 % — SIGNIFICANT CHANGE UP (ref 39–50)
HGB BLD-MCNC: 13.7 G/DL — SIGNIFICANT CHANGE UP (ref 13–17)
IMM GRANULOCYTES NFR BLD AUTO: 0.3 % — SIGNIFICANT CHANGE UP (ref 0–1.5)
LYMPHOCYTES # BLD AUTO: 0.69 K/UL — LOW (ref 1–3.3)
LYMPHOCYTES # BLD AUTO: 12 % — LOW (ref 13–44)
MCHC RBC-ENTMCNC: 26.1 PG — LOW (ref 27–34)
MCHC RBC-ENTMCNC: 31.9 G/DL — LOW (ref 32–36)
MCV RBC AUTO: 81.7 FL — SIGNIFICANT CHANGE UP (ref 80–100)
MONOCYTES # BLD AUTO: 0.46 K/UL — SIGNIFICANT CHANGE UP (ref 0–0.9)
MONOCYTES NFR BLD AUTO: 8 % — SIGNIFICANT CHANGE UP (ref 2–14)
NEUTROPHILS # BLD AUTO: 4.45 K/UL — SIGNIFICANT CHANGE UP (ref 1.8–7.4)
NEUTROPHILS NFR BLD AUTO: 77.1 % — HIGH (ref 43–77)
NRBC # BLD: 0 /100 WBCS — SIGNIFICANT CHANGE UP (ref 0–0)
PLATELET # BLD AUTO: 239 K/UL — SIGNIFICANT CHANGE UP (ref 150–400)
RBC # BLD: 5.25 M/UL — SIGNIFICANT CHANGE UP (ref 4.2–5.8)
RBC # FLD: 15 % — HIGH (ref 10.3–14.5)
WBC # BLD: 5.77 K/UL — SIGNIFICANT CHANGE UP (ref 3.8–10.5)
WBC # FLD AUTO: 5.77 K/UL — SIGNIFICANT CHANGE UP (ref 3.8–10.5)

## 2020-10-16 DIAGNOSIS — C7A.8 OTHER MALIGNANT NEUROENDOCRINE TUMORS: ICD-10-CM

## 2020-10-24 LAB
ALBUMIN SERPL ELPH-MCNC: 4.3 G/DL
ALP BLD-CCNC: 88 U/L
ALT SERPL-CCNC: 24 U/L
ANION GAP SERPL CALC-SCNC: 11 MMOL/L
AST SERPL-CCNC: 23 U/L
BILIRUB SERPL-MCNC: 0.2 MG/DL
BUN SERPL-MCNC: 17 MG/DL
CALCIUM SERPL-MCNC: 9.1 MG/DL
CEA SERPL-MCNC: 2 NG/ML
CGA SERPL-MCNC: 1393 NG/ML
CHLORIDE SERPL-SCNC: 103 MMOL/L
CO2 SERPL-SCNC: 26 MMOL/L
CREAT SERPL-MCNC: 0.77 MG/DL
GLUCOSE SERPL-MCNC: 92 MG/DL
POTASSIUM SERPL-SCNC: 4.6 MMOL/L
PROT SERPL-MCNC: 6.5 G/DL
SEROTONIN SERUM: 1042 NG/ML
SODIUM SERPL-SCNC: 140 MMOL/L

## 2020-11-06 ENCOUNTER — NON-APPOINTMENT (OUTPATIENT)
Age: 60
End: 2020-11-06

## 2020-11-09 ENCOUNTER — APPOINTMENT (OUTPATIENT)
Dept: HEMATOLOGY ONCOLOGY | Facility: CLINIC | Age: 60
End: 2020-11-09
Payer: MEDICAID

## 2020-11-09 VITALS
RESPIRATION RATE: 16 BRPM | OXYGEN SATURATION: 94 % | TEMPERATURE: 98.4 F | DIASTOLIC BLOOD PRESSURE: 84 MMHG | HEART RATE: 71 BPM | SYSTOLIC BLOOD PRESSURE: 146 MMHG

## 2020-11-09 PROCEDURE — 99072 ADDL SUPL MATRL&STAF TM PHE: CPT

## 2020-11-09 PROCEDURE — 99214 OFFICE O/P EST MOD 30 MIN: CPT

## 2020-11-09 NOTE — HISTORY OF PRESENT ILLNESS
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 The patient's history dates back to March 2013 when he presented with abdominal pain and was found to have liver metastases. The biopsy revealed well differentiated neuroendocrine tumor. He was found to have bone involvement at that time. His treatment consisted of monthly injections of Sandostatin. In April 2015 patient developed increasing abdominal pain and was found to have a bowel obstruction requiring small bowel resection. The pathology again revealed well-differentiated neuroendocrine tumor which is low grade involving the jejunum. The tumor measured 1.2 cm. Margins were negative. There was metastases in one of 3 lymph nodes.\par \par The patient then was well until March 2018 when he developed neck pain and was found to have an intramedullary lesion in C6-7. This required radiation to the spine in 10 fractions He subsequently developed myelopathy requiring IV steroids. In June 2018 he was hospitalized because of decreasing neurologic function during a steroid taper. In October 2018 he was hospitalized in [facility] tdue to septic shock, atrial fibrillation and altered mental status. He also developed cellulitis and retroperitoneal hematoma. In December 2018 he developed increasing shortness of breath due to a left pleural effusion which was negative on cytology.\par \par The patient subsequently has been stable and maintained on monthly injections of Sandostatin 30 mg which he tolerates well. At the present time his condition and disease has been controlled. However he does have occasional arm and leg tingling and has undergone neurologic followup. The patient currently is in rehabilitation undergoing physical therapy. He has some left leg movement but otherwise is hemiplegic. The patient has been off all steroids for about one year. He has not demonstrated signs or symptoms of carcinoid syndrome. He does note intermittent muscle-type pain in his back, left wrist and left upper extremity. The patient has been on several toe which was started one year ago for a blood clot in the leg. He has undergone recent CAT scans of the chest abdomen and pelvis which have basically shown stable disease. He is currently transferring his medical care to a change in insurance.\par \par  [de-identified] : well diff NET [de-identified] : Patient is here for evaluation. Patient c/o increased facial flushing, hot flashes and diarrhea for over 1 weeks. He has not taken any anti-diarrhea medication because he is afraid to becoming constipated or impacted which would cause increase discomfort since he is a paraplegic. He has some back spasms on and off.

## 2020-11-09 NOTE — ASSESSMENT
[Palliative] : Goals of care discussed with patient: Palliative [Palliative Care Plan] : not applicable at this time [FreeTextEntry1] : The patient has increased facial flushing, hot flashes and diarrhea. Will increase Sandostatin injections to 40 mg monthly. Will monitor for side effects and toxicities. Last MRI of spine and CT-scans of abdomen/pelviss/chest shows decreased/stable disease. Will order repeat scans in 2 months. He will return in 2  month or sooner as needed.

## 2020-11-09 NOTE — REVIEW OF SYSTEMS
[Diarrhea] : diarrhea [Hot Flashes] : hot flashes [Negative] : ENT [Fever] : no fever [Chills] : no chills [Night Sweats] : no night sweats [Fatigue] : no fatigue [Recent Change In Weight] : ~T no recent weight change [Abdominal Pain] : no abdominal pain [Vomiting] : no vomiting [FreeTextEntry2] : increase flushing and hot flashes. [FreeTextEntry7] : +loose/watery stools x 2 [FreeTextEntry9] : remains paraplegic patient has pain in the left foot and chronic low back pain.

## 2020-11-10 ENCOUNTER — OUTPATIENT (OUTPATIENT)
Dept: OUTPATIENT SERVICES | Facility: HOSPITAL | Age: 60
LOS: 1 days | Discharge: HOME | End: 2020-11-10

## 2020-11-10 DIAGNOSIS — C71.9 MALIGNANT NEOPLASM OF BRAIN, UNSPECIFIED: ICD-10-CM

## 2020-11-10 DIAGNOSIS — Z90.49 ACQUIRED ABSENCE OF OTHER SPECIFIED PARTS OF DIGESTIVE TRACT: Chronic | ICD-10-CM

## 2020-11-16 ENCOUNTER — APPOINTMENT (OUTPATIENT)
Dept: INFUSION THERAPY | Facility: HOSPITAL | Age: 60
End: 2020-11-16

## 2020-11-16 ENCOUNTER — RESULT REVIEW (OUTPATIENT)
Age: 60
End: 2020-11-16

## 2020-11-16 DIAGNOSIS — C7A.8 OTHER MALIGNANT NEUROENDOCRINE TUMORS: ICD-10-CM

## 2020-11-16 LAB
BASOPHILS # BLD AUTO: 0.03 K/UL — SIGNIFICANT CHANGE UP (ref 0–0.2)
BASOPHILS NFR BLD AUTO: 0.5 % — SIGNIFICANT CHANGE UP (ref 0–2)
EOSINOPHIL # BLD AUTO: 0.09 K/UL — SIGNIFICANT CHANGE UP (ref 0–0.5)
EOSINOPHIL NFR BLD AUTO: 1.5 % — SIGNIFICANT CHANGE UP (ref 0–6)
HCT VFR BLD CALC: 40.8 % — SIGNIFICANT CHANGE UP (ref 39–50)
HGB BLD-MCNC: 13.4 G/DL — SIGNIFICANT CHANGE UP (ref 13–17)
IMM GRANULOCYTES NFR BLD AUTO: 0.3 % — SIGNIFICANT CHANGE UP (ref 0–1.5)
LYMPHOCYTES # BLD AUTO: 0.81 K/UL — LOW (ref 1–3.3)
LYMPHOCYTES # BLD AUTO: 13.7 % — SIGNIFICANT CHANGE UP (ref 13–44)
MCHC RBC-ENTMCNC: 26.7 PG — LOW (ref 27–34)
MCHC RBC-ENTMCNC: 32.8 G/DL — SIGNIFICANT CHANGE UP (ref 32–36)
MCV RBC AUTO: 81.4 FL — SIGNIFICANT CHANGE UP (ref 80–100)
MONOCYTES # BLD AUTO: 0.46 K/UL — SIGNIFICANT CHANGE UP (ref 0–0.9)
MONOCYTES NFR BLD AUTO: 7.8 % — SIGNIFICANT CHANGE UP (ref 2–14)
NEUTROPHILS # BLD AUTO: 4.5 K/UL — SIGNIFICANT CHANGE UP (ref 1.8–7.4)
NEUTROPHILS NFR BLD AUTO: 76.2 % — SIGNIFICANT CHANGE UP (ref 43–77)
NRBC # BLD: 0 /100 WBCS — SIGNIFICANT CHANGE UP (ref 0–0)
PLATELET # BLD AUTO: 233 K/UL — SIGNIFICANT CHANGE UP (ref 150–400)
RBC # BLD: 5.01 M/UL — SIGNIFICANT CHANGE UP (ref 4.2–5.8)
RBC # FLD: 14.6 % — HIGH (ref 10.3–14.5)
WBC # BLD: 5.91 K/UL — SIGNIFICANT CHANGE UP (ref 3.8–10.5)
WBC # FLD AUTO: 5.91 K/UL — SIGNIFICANT CHANGE UP (ref 3.8–10.5)

## 2020-12-11 ENCOUNTER — OUTPATIENT (OUTPATIENT)
Dept: OUTPATIENT SERVICES | Facility: HOSPITAL | Age: 60
LOS: 1 days | Discharge: HOME | End: 2020-12-11

## 2020-12-11 DIAGNOSIS — Z90.49 ACQUIRED ABSENCE OF OTHER SPECIFIED PARTS OF DIGESTIVE TRACT: Chronic | ICD-10-CM

## 2020-12-11 DIAGNOSIS — D3A.8 OTHER BENIGN NEUROENDOCRINE TUMORS: ICD-10-CM

## 2020-12-15 ENCOUNTER — LABORATORY RESULT (OUTPATIENT)
Age: 60
End: 2020-12-15

## 2020-12-15 ENCOUNTER — RESULT REVIEW (OUTPATIENT)
Age: 60
End: 2020-12-15

## 2020-12-15 ENCOUNTER — APPOINTMENT (OUTPATIENT)
Dept: INFUSION THERAPY | Facility: HOSPITAL | Age: 60
End: 2020-12-15

## 2021-01-15 ENCOUNTER — OUTPATIENT (OUTPATIENT)
Dept: OUTPATIENT SERVICES | Facility: HOSPITAL | Age: 61
LOS: 1 days | Discharge: HOME | End: 2021-01-15

## 2021-01-15 DIAGNOSIS — C71.9 MALIGNANT NEOPLASM OF BRAIN, UNSPECIFIED: ICD-10-CM

## 2021-01-15 DIAGNOSIS — Z90.49 ACQUIRED ABSENCE OF OTHER SPECIFIED PARTS OF DIGESTIVE TRACT: Chronic | ICD-10-CM

## 2021-01-21 ENCOUNTER — APPOINTMENT (OUTPATIENT)
Dept: INFUSION THERAPY | Facility: HOSPITAL | Age: 61
End: 2021-01-21

## 2021-01-28 ENCOUNTER — RESULT REVIEW (OUTPATIENT)
Age: 61
End: 2021-01-28

## 2021-01-28 ENCOUNTER — APPOINTMENT (OUTPATIENT)
Dept: INFUSION THERAPY | Facility: HOSPITAL | Age: 61
End: 2021-01-28

## 2021-01-28 DIAGNOSIS — C7A.8 OTHER MALIGNANT NEUROENDOCRINE TUMORS: ICD-10-CM

## 2021-01-28 LAB
BASOPHILS # BLD AUTO: 0.03 K/UL — SIGNIFICANT CHANGE UP (ref 0–0.2)
BASOPHILS NFR BLD AUTO: 0.6 % — SIGNIFICANT CHANGE UP (ref 0–2)
EOSINOPHIL # BLD AUTO: 0.11 K/UL — SIGNIFICANT CHANGE UP (ref 0–0.5)
EOSINOPHIL NFR BLD AUTO: 2.1 % — SIGNIFICANT CHANGE UP (ref 0–6)
HCT VFR BLD CALC: 41.4 % — SIGNIFICANT CHANGE UP (ref 39–50)
HGB BLD-MCNC: 13.4 G/DL — SIGNIFICANT CHANGE UP (ref 13–17)
IMM GRANULOCYTES NFR BLD AUTO: 0.4 % — SIGNIFICANT CHANGE UP (ref 0–1.5)
LYMPHOCYTES # BLD AUTO: 0.79 K/UL — LOW (ref 1–3.3)
LYMPHOCYTES # BLD AUTO: 14.9 % — SIGNIFICANT CHANGE UP (ref 13–44)
MCHC RBC-ENTMCNC: 26.6 PG — LOW (ref 27–34)
MCHC RBC-ENTMCNC: 32.4 G/DL — SIGNIFICANT CHANGE UP (ref 32–36)
MCV RBC AUTO: 82.3 FL — SIGNIFICANT CHANGE UP (ref 80–100)
MONOCYTES # BLD AUTO: 0.45 K/UL — SIGNIFICANT CHANGE UP (ref 0–0.9)
MONOCYTES NFR BLD AUTO: 8.5 % — SIGNIFICANT CHANGE UP (ref 2–14)
NEUTROPHILS # BLD AUTO: 3.9 K/UL — SIGNIFICANT CHANGE UP (ref 1.8–7.4)
NEUTROPHILS NFR BLD AUTO: 73.5 % — SIGNIFICANT CHANGE UP (ref 43–77)
NRBC # BLD: 0 /100 WBCS — SIGNIFICANT CHANGE UP (ref 0–0)
PLATELET # BLD AUTO: 205 K/UL — SIGNIFICANT CHANGE UP (ref 150–400)
RBC # BLD: 5.03 M/UL — SIGNIFICANT CHANGE UP (ref 4.2–5.8)
RBC # FLD: 14.7 % — HIGH (ref 10.3–14.5)
WBC # BLD: 5.3 K/UL — SIGNIFICANT CHANGE UP (ref 3.8–10.5)
WBC # FLD AUTO: 5.3 K/UL — SIGNIFICANT CHANGE UP (ref 3.8–10.5)

## 2021-02-01 LAB
ALBUMIN SERPL ELPH-MCNC: 4.4 G/DL
ALP BLD-CCNC: 98 U/L
ALT SERPL-CCNC: 25 U/L
ANION GAP SERPL CALC-SCNC: 12 MMOL/L
AST SERPL-CCNC: 21 U/L
BILIRUB SERPL-MCNC: 0.3 MG/DL
BUN SERPL-MCNC: 16 MG/DL
CALCIUM SERPL-MCNC: 9.5 MG/DL
CEA SERPL-MCNC: 2.5 NG/ML
CHLORIDE SERPL-SCNC: 103 MMOL/L
CO2 SERPL-SCNC: 25 MMOL/L
CREAT SERPL-MCNC: 0.88 MG/DL
GLUCOSE SERPL-MCNC: 77 MG/DL
POTASSIUM SERPL-SCNC: 4.9 MMOL/L
PROT SERPL-MCNC: 6.7 G/DL
SODIUM SERPL-SCNC: 140 MMOL/L

## 2021-02-02 LAB — CGA SERPL-MCNC: 1674 NG/ML

## 2021-02-09 ENCOUNTER — APPOINTMENT (OUTPATIENT)
Dept: NEUROLOGY | Facility: CLINIC | Age: 61
End: 2021-02-09
Payer: MEDICAID

## 2021-02-09 DIAGNOSIS — Z00.00 ENCOUNTER FOR GENERAL ADULT MEDICAL EXAMINATION W/OUT ABNORMAL FINDINGS: ICD-10-CM

## 2021-02-09 DIAGNOSIS — Z63.5 DISRUPTION OF FAMILY BY SEPARATION AND DIVORCE: ICD-10-CM

## 2021-02-09 DIAGNOSIS — Z87.891 PERSONAL HISTORY OF NICOTINE DEPENDENCE: ICD-10-CM

## 2021-02-09 PROCEDURE — 99072 ADDL SUPL MATRL&STAF TM PHE: CPT

## 2021-02-09 PROCEDURE — 99204 OFFICE O/P NEW MOD 45 MIN: CPT

## 2021-02-09 SDOH — SOCIAL STABILITY - SOCIAL INSECURITY: DISRUPTION OF FAMILY BY SEPARATION AND DIVORCE: Z63.5

## 2021-02-10 PROBLEM — Z63.5 DIVORCED: Status: ACTIVE | Noted: 2021-02-10

## 2021-02-10 PROBLEM — Z87.891 FORMER SMOKER: Status: ACTIVE | Noted: 2021-02-10

## 2021-02-10 RX ORDER — RIVAROXABAN 20 MG/1
20 TABLET, FILM COATED ORAL
Qty: 30 | Refills: 0 | Status: ACTIVE | COMMUNITY
Start: 2020-10-29

## 2021-02-10 RX ORDER — LEVOTHYROXINE SODIUM 0.12 MG/1
125 TABLET ORAL
Qty: 30 | Refills: 0 | Status: ACTIVE | COMMUNITY
Start: 2020-10-29

## 2021-02-10 RX ORDER — CEPHALEXIN 500 MG/1
500 CAPSULE ORAL
Qty: 28 | Refills: 0 | Status: ACTIVE | COMMUNITY
Start: 2020-11-24

## 2021-02-10 RX ORDER — METOPROLOL SUCCINATE 25 MG/1
25 TABLET, EXTENDED RELEASE ORAL
Qty: 30 | Refills: 0 | Status: ACTIVE | COMMUNITY
Start: 2020-12-28

## 2021-02-10 RX ORDER — ROSUVASTATIN CALCIUM 10 MG/1
10 TABLET, FILM COATED ORAL
Qty: 30 | Refills: 0 | Status: ACTIVE | COMMUNITY
Start: 2021-02-05

## 2021-02-10 RX ORDER — CHOLECALCIFEROL (VITAMIN D3) 125 MCG
125 MCG CAPSULE ORAL
Qty: 4 | Refills: 0 | Status: ACTIVE | COMMUNITY
Start: 2020-08-03

## 2021-02-10 RX ORDER — AMLODIPINE BESYLATE 5 MG/1
5 TABLET ORAL
Qty: 30 | Refills: 0 | Status: ACTIVE | COMMUNITY
Start: 2021-02-05

## 2021-02-10 RX ORDER — ALPRAZOLAM 0.25 MG/1
0.25 TABLET ORAL
Qty: 90 | Refills: 0 | Status: ACTIVE | COMMUNITY
Start: 2021-02-05

## 2021-02-10 RX ORDER — CYANOCOBALAMIN 1000 UG/ML
1000 INJECTION INTRAMUSCULAR; SUBCUTANEOUS
Qty: 3 | Refills: 0 | Status: ACTIVE | COMMUNITY
Start: 2020-07-16

## 2021-02-10 NOTE — REVIEW OF SYSTEMS
[Feeling Poorly] : feeling poorly [Leg Weakness] : leg weakness [Numbness] : numbness [Tingling] : tingling [Inability to Walk] : inability to walk [Loss Of Hearing] : hearing loss [As Noted in HPI] : as noted in HPI [Hot Flashes] : hot flashes [Erectile Dysfunction] : erectile dysfunction [Negative] : Heme/Lymph

## 2021-02-10 NOTE — PHYSICAL EXAM
[FreeTextEntry1] : General examination is unremarkable.  He is wheelchair-bound completely disabled and has an attendant who brought him to my office.  He is however very pleasant cooperative and relates excellent history does not show significant anxiety or depression and accommodated his life to the current situation.  There is no carotid bruit, thyromegaly or lymphadenopathy.  Chest is clear and heart sounds are normal.  Pedal pulsations are perceptible.  There is mild leg edema.  By history there is no trophic ulcers or bedsores.  Calf muscles are not tender though he has history of thrombophlebitis and is taking Xarelto.  He was diagnosed as possible atrial fibrillation but has been ruled out.\par \par Neurological evaluation\par \par Patient has normal memory language cognitive and behavioral functions and is surprisingly very pleasant cooperative and has good insight and judgment and no self-harm ideations paranoia delusions or hallucinations.\par \par Cranial nerve examination is completely normal with normal optic disks brisk reactive pupils full extraocular movements no nystagmus and no facial asymmetry.  He has significant bilateral hearing loss which is chronic unrelated to his tumor and is seeking good hearing aids.  Tongue is midline gag reflex is normal and there is no dysarthria or dysphagia or dyspnea.\par \par Motor evaluation reveals normal symmetric strength in all proximal and distal muscles of both upper extremity without atrophy fasciculation or abnormal movements however his biceps and brachioradialis reflexes are trace to 1+ whereas triceps reflexes 2+ bilaterally and there is no atrophy fasciculation or abnormal movements with normal finger-to-nose testing and normal sensation to all primary modalities.\par \par He is paraplegic with 1/5 strength in the left toe and nonprogressive with spasticity in the legs and has hyperreflexia of the knee joints and hyperreflexia of the knees and equivocal right Babinski sign and left is mute.  He has a sensory level just above his nipple line on the left and above the umbilicus on the right with normal position sense in the left but absent on the right.  I will look for trophic ulcers in the back but none were found and historically he does not have any bedsores.

## 2021-02-10 NOTE — DISCUSSION/SUMMARY
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  I had a gigi discussion that I do not have any extensive experience in general regarding neuroendocrine tumor and metastases and must remain under the excellent care of his oncologist meanwhile I will review literature to familiarize myself regarding the long-term prognosis but I told him frankly that any further recovery from paraplegia is unrealistic though minor changes of improvement can be seen as he himself stated that sensation have recovered somewhat in the lower extremities.  He has extensive medical issues and must remain under the care of his physicians and his neurologist and that he has the option to return back for follow-up while continuing physical therapy and if the muscle spasm become worse he can increase baclofen and continue with his psychiatric evaluation.  I had an extensive consultation education and counseling an option to call me and return back for follow-up.  His understanding was confirmed.

## 2021-02-10 NOTE — DATA REVIEWED
[de-identified] : I reviewed extensive neuroimaging studies which are delineated in his radiology reports and is extensive revealing evidence of metastases. [de-identified] : I reviewed extensive voluminous medical records of oncologists with delineation of excellent summary recently regarding the sequence of events plan of treatment with increasing Sandostatin dosage and careful follow-up evaluations.

## 2021-02-10 NOTE — HISTORY OF PRESENT ILLNESS
[FreeTextEntry1] : Mr. Wei is a 60-year-old  referred by Dr. Neri Altamirano his oncologist for neurological evaluation even though he has been evaluated by neurologists and wanted a second opinion.\par \par The patient has neuroendocrine tumor being treated with Sandostatin and the disease began in  with bony metastasis and multiple abdominal tumor with metastasis requiring extensive surgical resection and multiple complications.  He stated that he was evaluated by Dr. Collins at [facility] approximately 2 years ago for bilateral lower extremity weakness which was ultimately diagnosed and secondary to intramedullary metastasis of neuroendocrine tumor and was immediately treated with radiation therapy a year after the onset of the diagnosis at C5-C6 in 2019 but following the radiation he had spinal cord swelling and was treated with the steroids.\par \par He has bilateral lower extremity paralysis with no movements of the right leg and some movements of the left toes which are barely 1/5 and stated that it got worse after radiation therapy and the tumor still exists.\par \par He has history of low back pain, renal stone and has multiple CT scans of the abdomen with recurrence of cancer including small intestine liver and according to his oncologist bony mets and MRI of the brain was reportedly negative.  He has chronic bilateral hearing loss and had tried hearing aids and is still continuing the process.  He has history of tingling and numbness in the hands secondary to carpal tunnel syndrome which has been previously diagnosed.\par \par He frankly stated that he has brought a lawsuit against Dr. Collins for missing the cervical spinal cord tumor.\par \par Current complaints consist of paraplegia secondary to spinal cord metastases at C5-C6 level with bilateral hand numbness attributed to carpal tunnel syndrome for last 2 years has tingling and numbness in both lower extremities below the trunk but his sensation is slowly returning and is mostly limited to distal lower extremities has occasional cramps muscle spasm in the thoracic wall muscles and has urinary incontinence and uses a diaper and lack of bowel sensation and bowel urgency.\par \par He denied any cognitive language or memory dysfunction and is an  by profession but is completely 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  There is no headache diplopia dysarthria dysphagia or dyspnea and there is no loss of strength in the upper extremities other than distal numb feeling and tingling attributed to carpal tunnel syndrome and perhaps metastatic disease at C5-C6.  He had been extensively investigated continuously with imaging studies which I have reviewed.\par \par I reviewed his past medical history pertinent for wife who  of multiple myeloma and is currently a non-smoker does not abuse alcohol lives in a house and is on Social Security disability.\par \par His medications include Sandostatin, baclofen, gabapentin, levothyroxine, Xarelto for thrombophlebitis no evidence of atrial fibrillation and history of hypertension.  He has been Covid negative though positive for antibodies and when he was initially positive treated with antibody therapy at [facility] and subsequently Covid negative twice.

## 2021-02-14 LAB — SEROTONIN SERUM: 714 NG/ML

## 2021-02-22 ENCOUNTER — OUTPATIENT (OUTPATIENT)
Dept: OUTPATIENT SERVICES | Facility: HOSPITAL | Age: 61
LOS: 1 days | Discharge: HOME | End: 2021-02-22

## 2021-02-22 DIAGNOSIS — C71.9 MALIGNANT NEOPLASM OF BRAIN, UNSPECIFIED: ICD-10-CM

## 2021-02-22 DIAGNOSIS — Z90.49 ACQUIRED ABSENCE OF OTHER SPECIFIED PARTS OF DIGESTIVE TRACT: Chronic | ICD-10-CM

## 2021-02-25 ENCOUNTER — RESULT REVIEW (OUTPATIENT)
Age: 61
End: 2021-02-25

## 2021-02-25 ENCOUNTER — APPOINTMENT (OUTPATIENT)
Dept: INFUSION THERAPY | Facility: HOSPITAL | Age: 61
End: 2021-02-25

## 2021-02-25 DIAGNOSIS — C7A.8 OTHER MALIGNANT NEUROENDOCRINE TUMORS: ICD-10-CM

## 2021-02-25 LAB
BASOPHILS # BLD AUTO: 0.04 K/UL — SIGNIFICANT CHANGE UP (ref 0–0.2)
BASOPHILS NFR BLD AUTO: 0.7 % — SIGNIFICANT CHANGE UP (ref 0–2)
EOSINOPHIL # BLD AUTO: 0.1 K/UL — SIGNIFICANT CHANGE UP (ref 0–0.5)
EOSINOPHIL NFR BLD AUTO: 1.6 % — SIGNIFICANT CHANGE UP (ref 0–6)
HCT VFR BLD CALC: 43.5 % — SIGNIFICANT CHANGE UP (ref 39–50)
HGB BLD-MCNC: 14 G/DL — SIGNIFICANT CHANGE UP (ref 13–17)
IMM GRANULOCYTES NFR BLD AUTO: 0.5 % — SIGNIFICANT CHANGE UP (ref 0–1.5)
LYMPHOCYTES # BLD AUTO: 1.03 K/UL — SIGNIFICANT CHANGE UP (ref 1–3.3)
LYMPHOCYTES # BLD AUTO: 16.9 % — SIGNIFICANT CHANGE UP (ref 13–44)
MCHC RBC-ENTMCNC: 26.6 PG — LOW (ref 27–34)
MCHC RBC-ENTMCNC: 32.2 G/DL — SIGNIFICANT CHANGE UP (ref 32–36)
MCV RBC AUTO: 82.5 FL — SIGNIFICANT CHANGE UP (ref 80–100)
MONOCYTES # BLD AUTO: 0.37 K/UL — SIGNIFICANT CHANGE UP (ref 0–0.9)
MONOCYTES NFR BLD AUTO: 6.1 % — SIGNIFICANT CHANGE UP (ref 2–14)
NEUTROPHILS # BLD AUTO: 4.54 K/UL — SIGNIFICANT CHANGE UP (ref 1.8–7.4)
NEUTROPHILS NFR BLD AUTO: 74.2 % — SIGNIFICANT CHANGE UP (ref 43–77)
NRBC # BLD: 0 /100 WBCS — SIGNIFICANT CHANGE UP (ref 0–0)
PLATELET # BLD AUTO: 246 K/UL — SIGNIFICANT CHANGE UP (ref 150–400)
RBC # BLD: 5.27 M/UL — SIGNIFICANT CHANGE UP (ref 4.2–5.8)
RBC # FLD: 14.9 % — HIGH (ref 10.3–14.5)
WBC # BLD: 6.11 K/UL — SIGNIFICANT CHANGE UP (ref 3.8–10.5)
WBC # FLD AUTO: 6.11 K/UL — SIGNIFICANT CHANGE UP (ref 3.8–10.5)

## 2021-03-04 LAB
ALBUMIN SERPL ELPH-MCNC: 4.5 G/DL
ALP BLD-CCNC: 75 U/L
ALT SERPL-CCNC: 24 U/L
ANION GAP SERPL CALC-SCNC: 15 MMOL/L
AST SERPL-CCNC: 24 U/L
BILIRUB SERPL-MCNC: 0.4 MG/DL
BUN SERPL-MCNC: 14 MG/DL
CALCIUM SERPL-MCNC: 9.2 MG/DL
CEA SERPL-MCNC: 1.9 NG/ML
CGA SERPL-MCNC: 1296 NG/ML
CHLORIDE SERPL-SCNC: 100 MMOL/L
CO2 SERPL-SCNC: 24 MMOL/L
CREAT SERPL-MCNC: 0.83 MG/DL
GLUCOSE SERPL-MCNC: 94 MG/DL
POTASSIUM SERPL-SCNC: 4.7 MMOL/L
PROT SERPL-MCNC: 6.8 G/DL
SEROTONIN SERUM: 1061 NG/ML
SODIUM SERPL-SCNC: 139 MMOL/L

## 2021-03-11 ENCOUNTER — RESULT REVIEW (OUTPATIENT)
Age: 61
End: 2021-03-11

## 2021-03-11 ENCOUNTER — OUTPATIENT (OUTPATIENT)
Dept: OUTPATIENT SERVICES | Facility: HOSPITAL | Age: 61
LOS: 1 days | End: 2021-03-11
Payer: MEDICARE

## 2021-03-11 ENCOUNTER — APPOINTMENT (OUTPATIENT)
Dept: CT IMAGING | Facility: CLINIC | Age: 61
End: 2021-03-11
Payer: MEDICARE

## 2021-03-11 DIAGNOSIS — C79.51 SECONDARY MALIGNANT NEOPLASM OF BONE: ICD-10-CM

## 2021-03-11 DIAGNOSIS — Z90.49 ACQUIRED ABSENCE OF OTHER SPECIFIED PARTS OF DIGESTIVE TRACT: Chronic | ICD-10-CM

## 2021-03-11 PROCEDURE — G1004: CPT

## 2021-03-11 PROCEDURE — 71260 CT THORAX DX C+: CPT | Mod: 26,MG

## 2021-03-11 PROCEDURE — 74177 CT ABD & PELVIS W/CONTRAST: CPT | Mod: 26,MG

## 2021-03-11 PROCEDURE — 71260 CT THORAX DX C+: CPT

## 2021-03-11 PROCEDURE — 74177 CT ABD & PELVIS W/CONTRAST: CPT

## 2021-03-16 ENCOUNTER — APPOINTMENT (OUTPATIENT)
Dept: CT IMAGING | Facility: CLINIC | Age: 61
End: 2021-03-16

## 2021-03-24 ENCOUNTER — APPOINTMENT (OUTPATIENT)
Dept: MRI IMAGING | Facility: CLINIC | Age: 61
End: 2021-03-24
Payer: MEDICARE

## 2021-03-24 ENCOUNTER — OUTPATIENT (OUTPATIENT)
Dept: OUTPATIENT SERVICES | Facility: HOSPITAL | Age: 61
LOS: 1 days | Discharge: HOME | End: 2021-03-24

## 2021-03-24 ENCOUNTER — OUTPATIENT (OUTPATIENT)
Dept: OUTPATIENT SERVICES | Facility: HOSPITAL | Age: 61
LOS: 1 days | End: 2021-03-24
Payer: MEDICARE

## 2021-03-24 DIAGNOSIS — Z90.49 ACQUIRED ABSENCE OF OTHER SPECIFIED PARTS OF DIGESTIVE TRACT: Chronic | ICD-10-CM

## 2021-03-24 DIAGNOSIS — C71.9 MALIGNANT NEOPLASM OF BRAIN, UNSPECIFIED: ICD-10-CM

## 2021-03-24 DIAGNOSIS — C79.51 SECONDARY MALIGNANT NEOPLASM OF BONE: ICD-10-CM

## 2021-03-24 PROCEDURE — 72156 MRI NECK SPINE W/O & W/DYE: CPT | Mod: 26,MH

## 2021-03-24 PROCEDURE — G1004: CPT

## 2021-03-24 PROCEDURE — 72156 MRI NECK SPINE W/O & W/DYE: CPT

## 2021-03-24 PROCEDURE — 72157 MRI CHEST SPINE W/O & W/DYE: CPT | Mod: 26,MH

## 2021-03-24 PROCEDURE — 72158 MRI LUMBAR SPINE W/O & W/DYE: CPT | Mod: 26,MH

## 2021-03-24 PROCEDURE — 72157 MRI CHEST SPINE W/O & W/DYE: CPT

## 2021-03-24 PROCEDURE — 72158 MRI LUMBAR SPINE W/O & W/DYE: CPT

## 2021-03-29 ENCOUNTER — APPOINTMENT (OUTPATIENT)
Dept: INFUSION THERAPY | Facility: HOSPITAL | Age: 61
End: 2021-03-29

## 2021-03-30 ENCOUNTER — APPOINTMENT (OUTPATIENT)
Dept: HEMATOLOGY ONCOLOGY | Facility: CLINIC | Age: 61
End: 2021-03-30
Payer: MEDICARE

## 2021-03-30 DIAGNOSIS — G95.20 UNSPECIFIED CORD COMPRESSION: ICD-10-CM

## 2021-03-30 PROCEDURE — 99213 OFFICE O/P EST LOW 20 MIN: CPT | Mod: 95

## 2021-03-30 RX ORDER — EVEROLIMUS 5 MG/1
5 TABLET ORAL
Qty: 30 | Refills: 3 | Status: DISCONTINUED | COMMUNITY
Start: 2021-03-30 | End: 2021-03-30

## 2021-03-30 RX ORDER — EVEROLIMUS 5 MG/1
5 TABLET ORAL
Qty: 30 | Refills: 3 | Status: ACTIVE | COMMUNITY
Start: 2021-03-30 | End: 1900-01-01

## 2021-04-02 PROBLEM — G95.20 CORD COMPRESSION: Status: ACTIVE | Noted: 2020-03-17

## 2021-04-02 NOTE — HISTORY OF PRESENT ILLNESS
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 The patient's history dates back to March 2013 when he presented with abdominal pain and was found to have liver metastases. The biopsy revealed well differentiated neuroendocrine tumor. He was found to have bone involvement at that time. His treatment consisted of monthly injections of Sandostatin. In April 2015 patient developed increasing abdominal pain and was found to have a bowel obstruction requiring small bowel resection. The pathology again revealed well-differentiated neuroendocrine tumor which is low grade involving the jejunum. The tumor measured 1.2 cm. Margins were negative. There was metastases in one of 3 lymph nodes.\par \par The patient then was well until March 2018 when he developed neck pain and was found to have an intramedullary lesion in C6-7. This required radiation to the spine in 10 fractions He subsequently developed myelopathy requiring IV steroids. In June 2018 he was hospitalized because of decreasing neurologic function during a steroid taper. In October 2018 he was hospitalized in [facility] tdue to septic shock, atrial fibrillation and altered mental status. He also developed cellulitis and retroperitoneal hematoma. In December 2018 he developed increasing shortness of breath due to a left pleural effusion which was negative on cytology.\par \par The patient subsequently has been stable and maintained on monthly injections of Sandostatin 30 mg which he tolerates well. At the present time his condition and disease has been controlled. However he does have occasional arm and leg tingling and has undergone neurologic followup. The patient currently is in rehabilitation undergoing physical therapy. He has some left leg movement but otherwise is hemiplegic. The patient has been off all steroids for about one year. He has not demonstrated signs or symptoms of carcinoid syndrome. He does note intermittent muscle-type pain in his back, left wrist and left upper extremity. The patient has been on several toe which was started one year ago for a blood clot in the leg. He has undergone recent CAT scans of the chest abdomen and pelvis which have basically shown stable disease. He is currently transferring his medical care to a change in insurance.\par \par  [de-identified] : well diff NET [Home] : at home, [unfilled] , at the time of the visit. [Medical Office: ([facility])___] : at the medical office located in  [Verbal consent obtained from patient] : the patient, [unfilled] [de-identified] : Since the last visit the patient has remained stable but does note intermittent headaches.  He is has returned home with assistance.  He has undergone repeat CAT scans and returns to discuss the results.

## 2021-04-02 NOTE — REASON FOR VISIT
[Follow-Up Visit] : a follow-up [FreeTextEntry2] : Neuroendocrine carcinoma, spinal cord compression.

## 2021-04-02 NOTE — REVIEW OF SYSTEMS
[Negative] : Allergic/Immunologic [FreeTextEntry4] : Has postnasal drip. [FreeTextEntry5] : Patient being followed by cardiology on blood pressure medications. [FreeTextEntry7] : Patient notes heartburn on lying down. [FreeTextEntry9] : Patient has tightness of the muscles in his back and tingling of the nerve endings.

## 2021-04-02 NOTE — ASSESSMENT
[FreeTextEntry1] : A discussion took place regarding the results of the CAT scans which show slight activity with increased bone lesions.  The CAT scan showed increase in size and enhancement of an intramedullary lesion at C6-C7.  There is also no enhancement in the C4 vertebral body.  The CAT scan revealed stable mediastinal and retroperitoneal adenopathy and multiple liver lesions.  One of the liver lesions showed slight increase in size.  As result we discussed going on treatment with Afinitor and side effects were discussed in detail.  The patient is concerned about side effects and toxicity but it was explained that the doses would be reduced initially and built up slowly to prevent severe toxicity.  The patient will undergo repeat blood testing initially every 2 weeks and then monthly. [Palliative] : Goals of care discussed with patient: Palliative [Palliative Care Plan] : not applicable at this time

## 2021-04-15 ENCOUNTER — APPOINTMENT (OUTPATIENT)
Dept: HEMATOLOGY ONCOLOGY | Facility: CLINIC | Age: 61
End: 2021-04-15
Payer: MEDICARE

## 2021-04-15 PROCEDURE — 99213 OFFICE O/P EST LOW 20 MIN: CPT | Mod: 95

## 2021-04-15 NOTE — ASSESSMENT
[Palliative] : Goals of care discussed with patient: Palliative [Palliative Care Plan] : not applicable at this time [FreeTextEntry1] : As a result of follow-up CAT scans and MRI of the chest abdomen pelvis and spine there was evidence of slight increased activity of his neuroendocrine tumor with slight increase in one of the liver lesions and also increasing bone lesions noted on the MRI.  As a result we spoke to the patient about starting Afinitor at low doses to see if he tolerates the medication.  He has decided to go for another opinion at [place] and see Dr. Llamas.  He is also undergoing evaluation for spinal surgery as he was also diagnosed with spinal stenosis.  The patient has also undergone neurology consultation with Dr. Osorio who confirms the paralysis is due to spinal cord involvement from his malignancy.  The patient will be evaluated for possible clinical trials at [facility].  He will return as needed for follow-up.  He is also scheduling  Covid vaccine appointments.  He will return in 1 month or sooner as needed.  He will continue his octreotide injections.

## 2021-04-15 NOTE — REVIEW OF SYSTEMS
[Negative] : Allergic/Immunologic [FreeTextEntry2] : Occasional mild headaches responding to Tylenol. [FreeTextEntry4] : Sinus congestion on and off.  Patient has tried Allegra with no improvement. [FreeTextEntry5] : Patient to undergo cardiac evaluation. [FreeTextEntry6] : Patient has tightness of the chest muscles from his back but has no shortness of breath. [FreeTextEntry8] : Patient notes intermittent urinary pressure.

## 2021-04-15 NOTE — HISTORY OF PRESENT ILLNESS
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 The patient's history dates back to March 2013 when he presented with abdominal pain and was found to have liver metastases. The biopsy revealed well differentiated neuroendocrine tumor. He was found to have bone involvement at that time. His treatment consisted of monthly injections of Sandostatin. In April 2015 patient developed increasing abdominal pain and was found to have a bowel obstruction requiring small bowel resection. The pathology again revealed well-differentiated neuroendocrine tumor which is low grade involving the jejunum. The tumor measured 1.2 cm. Margins were negative. There was metastases in one of 3 lymph nodes.\par \par The patient then was well until March 2018 when he developed neck pain and was found to have an intramedullary lesion in C6-7. This required radiation to the spine in 10 fractions He subsequently developed myelopathy requiring IV steroids. In June 2018 he was hospitalized because of decreasing neurologic function during a steroid taper. In October 2018 he was hospitalized in [facility] tdue to septic shock, atrial fibrillation and altered mental status. He also developed cellulitis and retroperitoneal hematoma. In December 2018 he developed increasing shortness of breath due to a left pleural effusion which was negative on cytology.\par \par The patient subsequently has been stable and maintained on monthly injections of Sandostatin 30 mg which he tolerates well. At the present time his condition and disease has been controlled. However he does have occasional arm and leg tingling and has undergone neurologic followup. The patient currently is in rehabilitation undergoing physical therapy. He has some left leg movement but otherwise is hemiplegic. The patient has been off all steroids for about one year. He has not demonstrated signs or symptoms of carcinoid syndrome. He does note intermittent muscle-type pain in his back, left wrist and left upper extremity. The patient has been on several toe which was started one year ago for a blood clot in the leg. He has undergone recent CAT scans of the chest abdomen and pelvis which have basically shown stable disease. He is currently transferring his medical care to a change in insurance.\par \par  [de-identified] : well diff NET [de-identified] : Since the last visit the patient has developed evidence of increasing involvement of the spine and 1 liver lesion noted on CAT scan and MRI.  Overall he is doing well and has no new symptoms indicating progression of disease.  He is scheduling to receive 2 vaccines for the Covid virus.  The patient has undergone neurologic consultation with Dr. Osorio who confirmed that the paralysis is due to cord compression from his neuroendocrine tumor.  The patient will continue to be monitored for signs and symptoms indicating progressive disease.  He will return in 1 month or sooner as needed.  He will continue octreotide injections.

## 2021-04-15 NOTE — REASON FOR VISIT
[Follow-Up Visit] : a follow-up [Home] : at home, [unfilled] , at the time of the visit. [Medical Office: ([facility])___] : at the medical office located in  [Verbal consent obtained from patient] : the patient, [unfilled] [FreeTextEntry2] : Neuroendocrine tumor

## 2021-04-26 ENCOUNTER — OUTPATIENT (OUTPATIENT)
Dept: OUTPATIENT SERVICES | Facility: HOSPITAL | Age: 61
LOS: 1 days | Discharge: HOME | End: 2021-04-26

## 2021-04-26 DIAGNOSIS — C71.9 MALIGNANT NEOPLASM OF BRAIN, UNSPECIFIED: ICD-10-CM

## 2021-04-26 DIAGNOSIS — Z90.49 ACQUIRED ABSENCE OF OTHER SPECIFIED PARTS OF DIGESTIVE TRACT: Chronic | ICD-10-CM

## 2021-04-27 ENCOUNTER — APPOINTMENT (OUTPATIENT)
Dept: INFUSION THERAPY | Facility: HOSPITAL | Age: 61
End: 2021-04-27

## 2021-04-27 DIAGNOSIS — C7A.8 OTHER MALIGNANT NEUROENDOCRINE TUMORS: ICD-10-CM

## 2021-04-28 ENCOUNTER — NON-APPOINTMENT (OUTPATIENT)
Age: 61
End: 2021-04-28

## 2021-05-26 ENCOUNTER — OUTPATIENT (OUTPATIENT)
Dept: OUTPATIENT SERVICES | Facility: HOSPITAL | Age: 61
LOS: 1 days | Discharge: HOME | End: 2021-05-26

## 2021-05-26 DIAGNOSIS — Z90.49 ACQUIRED ABSENCE OF OTHER SPECIFIED PARTS OF DIGESTIVE TRACT: Chronic | ICD-10-CM

## 2021-05-26 DIAGNOSIS — C71.9 MALIGNANT NEOPLASM OF BRAIN, UNSPECIFIED: ICD-10-CM

## 2021-05-27 ENCOUNTER — APPOINTMENT (OUTPATIENT)
Dept: INFUSION THERAPY | Facility: HOSPITAL | Age: 61
End: 2021-05-27

## 2021-05-27 ENCOUNTER — RESULT REVIEW (OUTPATIENT)
Age: 61
End: 2021-05-27

## 2021-05-27 LAB
ALBUMIN SERPL ELPH-MCNC: 5 G/DL
ALP BLD-CCNC: 88 U/L
ALT SERPL-CCNC: 19 U/L
ANION GAP SERPL CALC-SCNC: 12 MMOL/L
AST SERPL-CCNC: 20 U/L
BASOPHILS # BLD AUTO: 0.07 K/UL — SIGNIFICANT CHANGE UP (ref 0–0.2)
BASOPHILS NFR BLD AUTO: 0.9 % — SIGNIFICANT CHANGE UP (ref 0–2)
BILIRUB SERPL-MCNC: 0.3 MG/DL
BUN SERPL-MCNC: 21 MG/DL
CALCIUM SERPL-MCNC: 9.5 MG/DL
CEA SERPL-MCNC: 2.6 NG/ML
CHLORIDE SERPL-SCNC: 106 MMOL/L
CO2 SERPL-SCNC: 20 MMOL/L
CREAT SERPL-MCNC: 0.99 MG/DL
EOSINOPHIL # BLD AUTO: 0.12 K/UL — SIGNIFICANT CHANGE UP (ref 0–0.5)
EOSINOPHIL NFR BLD AUTO: 1.6 % — SIGNIFICANT CHANGE UP (ref 0–6)
GLUCOSE SERPL-MCNC: 83 MG/DL
HCT VFR BLD CALC: 44.2 % — SIGNIFICANT CHANGE UP (ref 39–50)
HGB BLD-MCNC: 14.1 G/DL — SIGNIFICANT CHANGE UP (ref 13–17)
IMM GRANULOCYTES NFR BLD AUTO: 0.5 % — SIGNIFICANT CHANGE UP (ref 0–1.5)
LYMPHOCYTES # BLD AUTO: 0.98 K/UL — LOW (ref 1–3.3)
LYMPHOCYTES # BLD AUTO: 12.9 % — LOW (ref 13–44)
MCHC RBC-ENTMCNC: 26.8 PG — LOW (ref 27–34)
MCHC RBC-ENTMCNC: 31.9 G/DL — LOW (ref 32–36)
MCV RBC AUTO: 83.9 FL — SIGNIFICANT CHANGE UP (ref 80–100)
MONOCYTES # BLD AUTO: 0.53 K/UL — SIGNIFICANT CHANGE UP (ref 0–0.9)
MONOCYTES NFR BLD AUTO: 7 % — SIGNIFICANT CHANGE UP (ref 2–14)
NEUTROPHILS # BLD AUTO: 5.87 K/UL — SIGNIFICANT CHANGE UP (ref 1.8–7.4)
NEUTROPHILS NFR BLD AUTO: 77.1 % — HIGH (ref 43–77)
NRBC # BLD: 0 /100 WBCS — SIGNIFICANT CHANGE UP (ref 0–0)
PLATELET # BLD AUTO: 235 K/UL — SIGNIFICANT CHANGE UP (ref 150–400)
POTASSIUM SERPL-SCNC: 5.2 MMOL/L
PROT SERPL-MCNC: 7.3 G/DL
RBC # BLD: 5.27 M/UL — SIGNIFICANT CHANGE UP (ref 4.2–5.8)
RBC # FLD: 14.2 % — SIGNIFICANT CHANGE UP (ref 10.3–14.5)
SODIUM SERPL-SCNC: 138 MMOL/L
WBC # BLD: 7.61 K/UL — SIGNIFICANT CHANGE UP (ref 3.8–10.5)
WBC # FLD AUTO: 7.61 K/UL — SIGNIFICANT CHANGE UP (ref 3.8–10.5)

## 2021-05-28 DIAGNOSIS — C7A.8 OTHER MALIGNANT NEUROENDOCRINE TUMORS: ICD-10-CM

## 2021-06-02 LAB — CGA SERPL-MCNC: 1569 NG/ML

## 2021-06-03 LAB — SEROTONIN SERUM: 1640 NG/ML

## 2021-06-23 NOTE — CONSULT NOTE ADULT - EYES
EOMI; PERRL; no drainage or redness
EOMI; PERRL; no drainage or redness
Clindamycin Pregnancy And Lactation Text: This medication can be used in pregnancy if certain situations. Clindamycin is also present in breast milk.

## 2021-06-25 ENCOUNTER — APPOINTMENT (OUTPATIENT)
Dept: INFUSION THERAPY | Facility: HOSPITAL | Age: 61
End: 2021-06-25

## 2021-07-06 ENCOUNTER — NON-APPOINTMENT (OUTPATIENT)
Age: 61
End: 2021-07-06

## 2021-07-22 ENCOUNTER — OUTPATIENT (OUTPATIENT)
Dept: OUTPATIENT SERVICES | Facility: HOSPITAL | Age: 61
LOS: 1 days | Discharge: HOME | End: 2021-07-22

## 2021-07-22 DIAGNOSIS — C71.9 MALIGNANT NEOPLASM OF BRAIN, UNSPECIFIED: ICD-10-CM

## 2021-07-22 DIAGNOSIS — Z90.49 ACQUIRED ABSENCE OF OTHER SPECIFIED PARTS OF DIGESTIVE TRACT: Chronic | ICD-10-CM

## 2021-07-26 ENCOUNTER — RESULT REVIEW (OUTPATIENT)
Age: 61
End: 2021-07-26

## 2021-07-26 ENCOUNTER — APPOINTMENT (OUTPATIENT)
Dept: INFUSION THERAPY | Facility: HOSPITAL | Age: 61
End: 2021-07-26

## 2021-07-26 ENCOUNTER — APPOINTMENT (OUTPATIENT)
Dept: HEMATOLOGY ONCOLOGY | Facility: CLINIC | Age: 61
End: 2021-07-26
Payer: MEDICARE

## 2021-07-26 VITALS
DIASTOLIC BLOOD PRESSURE: 89 MMHG | TEMPERATURE: 98.6 F | OXYGEN SATURATION: 95 % | RESPIRATION RATE: 16 BRPM | HEART RATE: 69 BPM | SYSTOLIC BLOOD PRESSURE: 147 MMHG

## 2021-07-26 DIAGNOSIS — C7A.8 OTHER MALIGNANT NEUROENDOCRINE TUMORS: ICD-10-CM

## 2021-07-26 DIAGNOSIS — C79.51 SECONDARY MALIGNANT NEOPLASM OF BONE: ICD-10-CM

## 2021-07-26 LAB
ALBUMIN SERPL ELPH-MCNC: 4.3 G/DL
ALP BLD-CCNC: 90 U/L
ALT SERPL-CCNC: 14 U/L
ANION GAP SERPL CALC-SCNC: 13 MMOL/L
AST SERPL-CCNC: 16 U/L
BASOPHILS # BLD AUTO: 0.03 K/UL — SIGNIFICANT CHANGE UP (ref 0–0.2)
BASOPHILS NFR BLD AUTO: 0.4 % — SIGNIFICANT CHANGE UP (ref 0–2)
BILIRUB SERPL-MCNC: 0.3 MG/DL
BUN SERPL-MCNC: 18 MG/DL
CALCIUM SERPL-MCNC: 9.2 MG/DL
CEA SERPL-MCNC: 1.7 NG/ML
CHLORIDE SERPL-SCNC: 103 MMOL/L
CO2 SERPL-SCNC: 24 MMOL/L
CREAT SERPL-MCNC: 0.73 MG/DL
EOSINOPHIL # BLD AUTO: 0.12 K/UL — SIGNIFICANT CHANGE UP (ref 0–0.5)
EOSINOPHIL NFR BLD AUTO: 1.6 % — SIGNIFICANT CHANGE UP (ref 0–6)
GLUCOSE SERPL-MCNC: 100 MG/DL
HCT VFR BLD CALC: 39.4 % — SIGNIFICANT CHANGE UP (ref 39–50)
HGB BLD-MCNC: 12.5 G/DL — LOW (ref 13–17)
IMM GRANULOCYTES NFR BLD AUTO: 0.7 % — SIGNIFICANT CHANGE UP (ref 0–1.5)
LYMPHOCYTES # BLD AUTO: 0.86 K/UL — LOW (ref 1–3.3)
LYMPHOCYTES # BLD AUTO: 11.6 % — LOW (ref 13–44)
MCHC RBC-ENTMCNC: 27.1 PG — SIGNIFICANT CHANGE UP (ref 27–34)
MCHC RBC-ENTMCNC: 31.7 G/DL — LOW (ref 32–36)
MCV RBC AUTO: 85.3 FL — SIGNIFICANT CHANGE UP (ref 80–100)
MONOCYTES # BLD AUTO: 0.66 K/UL — SIGNIFICANT CHANGE UP (ref 0–0.9)
MONOCYTES NFR BLD AUTO: 8.9 % — SIGNIFICANT CHANGE UP (ref 2–14)
NEUTROPHILS # BLD AUTO: 5.68 K/UL — SIGNIFICANT CHANGE UP (ref 1.8–7.4)
NEUTROPHILS NFR BLD AUTO: 76.8 % — SIGNIFICANT CHANGE UP (ref 43–77)
NRBC # BLD: 0 /100 WBCS — SIGNIFICANT CHANGE UP (ref 0–0)
PLATELET # BLD AUTO: 210 K/UL — SIGNIFICANT CHANGE UP (ref 150–400)
POTASSIUM SERPL-SCNC: 5.1 MMOL/L
PROT SERPL-MCNC: 6.7 G/DL
RBC # BLD: 4.62 M/UL — SIGNIFICANT CHANGE UP (ref 4.2–5.8)
RBC # FLD: 14.6 % — HIGH (ref 10.3–14.5)
SODIUM SERPL-SCNC: 140 MMOL/L
WBC # BLD: 7.4 K/UL — SIGNIFICANT CHANGE UP (ref 3.8–10.5)
WBC # FLD AUTO: 7.4 K/UL — SIGNIFICANT CHANGE UP (ref 3.8–10.5)

## 2021-07-26 PROCEDURE — 99214 OFFICE O/P EST MOD 30 MIN: CPT

## 2021-07-26 RX ORDER — VALSARTAN 80 MG/1
80 TABLET, COATED ORAL DAILY
Refills: 0 | Status: ACTIVE | COMMUNITY
Start: 2020-10-08

## 2021-07-26 RX ORDER — BACLOFEN 10 MG/1
10 TABLET ORAL 3 TIMES DAILY
Refills: 0 | Status: ACTIVE | COMMUNITY
Start: 2021-01-25

## 2021-07-26 RX ORDER — GABAPENTIN 300 MG/1
300 CAPSULE ORAL 3 TIMES DAILY
Refills: 0 | Status: ACTIVE | COMMUNITY
Start: 2020-10-29

## 2021-07-27 PROBLEM — C79.51 BONE METASTASIS: Status: ACTIVE | Noted: 2020-03-17

## 2021-07-28 LAB — CGA SERPL-MCNC: 1362 NG/ML

## 2021-08-02 LAB — SEROTONIN SERUM: 1684 NG/ML

## 2021-08-22 NOTE — ASSESSMENT
[Palliative] : Goals of care discussed with patient: Palliative [Palliative Care Plan] : not applicable at this time [FreeTextEntry1] : As a result of follow-up CAT scans and MRI of the chest abdomen pelvis and spine there was evidence of slight increased activity of his neuroendocrine tumor with slight increase in one of the liver lesions and also increasing bone lesions noted on the MRI.  Patient is s/p cervical spine surgery. He will also start treatment at [facility] to decrease Neuroendocrine cancer at [facility] called PRRT. He has decided that he did not want to start Afinitor drug at this time. He will continue the Octreotide injection 40 mg IM every 28 days. Labs have been ordered, f/u. RTO in 2 months.

## 2021-08-22 NOTE — PHYSICAL EXAM
[Capable of only limited self care, confined to bed or chair more than 50% of waking hours] : Status 3- Capable of only limited self care, confined to bed or chair more than 50% of waking hours [Normal] : affect appropriate [de-identified] : + healing scar to posterior neck, no erythema, no pus drainage. [de-identified] : + sitting in wheelchair with soft boots brace to bilateral lower extremities.

## 2021-08-22 NOTE — HISTORY OF PRESENT ILLNESS
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 The patient's history dates back to March 2013 when he presented with abdominal pain and was found to have liver metastases. The biopsy revealed well differentiated neuroendocrine tumor. He was found to have bone involvement at that time. His treatment consisted of monthly injections of Sandostatin. In April 2015 patient developed increasing abdominal pain and was found to have a bowel obstruction requiring small bowel resection. The pathology again revealed well-differentiated neuroendocrine tumor which is low grade involving the jejunum. The tumor measured 1.2 cm. Margins were negative. There was metastases in one of 3 lymph nodes.\par \par The patient then was well until March 2018 when he developed neck pain and was found to have an intramedullary lesion in C6-7. This required radiation to the spine in 10 fractions He subsequently developed myelopathy requiring IV steroids. In June 2018 he was hospitalized because of decreasing neurologic function during a steroid taper. In October 2018 he was hospitalized in [facility] tdue to septic shock, atrial fibrillation and altered mental status. He also developed cellulitis and retroperitoneal hematoma. In December 2018 he developed increasing shortness of breath due to a left pleural effusion which was negative on cytology.\par \par The patient subsequently has been stable and maintained on monthly injections of Sandostatin 30 mg which he tolerates well. At the present time his condition and disease has been controlled. However he does have occasional arm and leg tingling and has undergone neurologic followup. The patient currently is in rehabilitation undergoing physical therapy. He has some left leg movement but otherwise is hemiplegic. The patient has been off all steroids for about one year. He has not demonstrated signs or symptoms of carcinoid syndrome. He does note intermittent muscle-type pain in his back, left wrist and left upper extremity. The patient has been on several toe which was started one year ago for a blood clot in the leg. He has undergone recent CAT scans of the chest abdomen and pelvis which have basically shown stable disease. He is currently transferring his medical care to a change in insurance.\par \par  [de-identified] : well diff NET [de-identified] : Patient is here for f/u. He feels okay. He had surgery to neck 1 month ago.  He is s/p C3-7 Laminoplasty of multiple vertebrae and Cervical spine Decompression and reconstruction of posterior spine using bridging bone graft and wire. He admits to having some sensation to lower extremities, and he start rehab soon. Otherwise, he has no diarrhea and hot flashes/flushing.

## 2022-01-01 ENCOUNTER — OUTPATIENT (OUTPATIENT)
Dept: OUTPATIENT SERVICES | Facility: HOSPITAL | Age: 62
LOS: 1 days | Discharge: HOME | End: 2022-01-01

## 2022-01-01 ENCOUNTER — APPOINTMENT (OUTPATIENT)
Dept: INFUSION THERAPY | Facility: HOSPITAL | Age: 62
End: 2022-01-01

## 2022-01-01 ENCOUNTER — APPOINTMENT (OUTPATIENT)
Dept: HEMATOLOGY ONCOLOGY | Facility: CLINIC | Age: 62
End: 2022-01-01

## 2022-01-01 DIAGNOSIS — C71.9 MALIGNANT NEOPLASM OF BRAIN, UNSPECIFIED: ICD-10-CM

## 2022-01-01 DIAGNOSIS — Z90.49 ACQUIRED ABSENCE OF OTHER SPECIFIED PARTS OF DIGESTIVE TRACT: Chronic | ICD-10-CM

## 2023-01-01 ENCOUNTER — INPATIENT (INPATIENT)
Facility: HOSPITAL | Age: 63
LOS: 0 days | DRG: 871 | End: 2023-02-27
Attending: INTERNAL MEDICINE | Admitting: INTERNAL MEDICINE
Payer: MEDICARE

## 2023-01-01 ENCOUNTER — APPOINTMENT (OUTPATIENT)
Dept: HEMATOLOGY ONCOLOGY | Facility: CLINIC | Age: 63
End: 2023-01-01
Payer: MEDICARE

## 2023-01-01 ENCOUNTER — APPOINTMENT (OUTPATIENT)
Dept: INFUSION THERAPY | Facility: HOSPITAL | Age: 63
End: 2023-01-01

## 2023-01-01 VITALS — TEMPERATURE: 96.9 F | DIASTOLIC BLOOD PRESSURE: 81 MMHG | SYSTOLIC BLOOD PRESSURE: 94 MMHG | RESPIRATION RATE: 15 BRPM

## 2023-01-01 VITALS — RESPIRATION RATE: 23 BRPM | SYSTOLIC BLOOD PRESSURE: 67 MMHG | HEART RATE: 150 BPM | DIASTOLIC BLOOD PRESSURE: 47 MMHG

## 2023-01-01 VITALS — HEART RATE: 103 BPM | OXYGEN SATURATION: 92 %

## 2023-01-01 DIAGNOSIS — I46.9 CARDIAC ARREST, CAUSE UNSPECIFIED: ICD-10-CM

## 2023-01-01 DIAGNOSIS — Z79.899 OTHER LONG TERM (CURRENT) DRUG THERAPY: ICD-10-CM

## 2023-01-01 DIAGNOSIS — C7A.8 OTHER MALIGNANT NEUROENDOCRINE TUMORS: ICD-10-CM

## 2023-01-01 DIAGNOSIS — Z90.49 ACQUIRED ABSENCE OF OTHER SPECIFIED PARTS OF DIGESTIVE TRACT: Chronic | ICD-10-CM

## 2023-01-01 LAB
ALBUMIN SERPL ELPH-MCNC: 1.3 G/DL — LOW (ref 3.3–5)
ALP SERPL-CCNC: 241 U/L — HIGH (ref 40–120)
ALT FLD-CCNC: 40 U/L — SIGNIFICANT CHANGE UP (ref 10–45)
ANION GAP SERPL CALC-SCNC: 15 MMOL/L — SIGNIFICANT CHANGE UP (ref 5–17)
APPEARANCE UR: ABNORMAL
AST SERPL-CCNC: 108 U/L — HIGH (ref 10–40)
BACTERIA # UR AUTO: ABNORMAL /HPF
BASE EXCESS BLDA CALC-SCNC: -15.5 MMOL/L — LOW (ref -2–3)
BASE EXCESS BLDA CALC-SCNC: -9.8 MMOL/L — LOW (ref -2–3)
BASOPHILS # BLD AUTO: 0.06 K/UL — SIGNIFICANT CHANGE UP (ref 0–0.2)
BASOPHILS NFR BLD AUTO: 0.5 % — SIGNIFICANT CHANGE UP (ref 0–2)
BILIRUB SERPL-MCNC: 0.7 MG/DL — SIGNIFICANT CHANGE UP (ref 0.2–1.2)
BILIRUB UR-MCNC: ABNORMAL
BUN SERPL-MCNC: 46 MG/DL — HIGH (ref 7–23)
CALCIUM SERPL-MCNC: 8 MG/DL — LOW (ref 8.4–10.5)
CHLORIDE SERPL-SCNC: 111 MMOL/L — HIGH (ref 96–108)
CO2 BLDA-SCNC: 15 MMOL/L — LOW (ref 19–24)
CO2 BLDA-SCNC: 18 MMOL/L — LOW (ref 19–24)
CO2 SERPL-SCNC: 33 MMOL/L — HIGH (ref 22–31)
COLOR SPEC: YELLOW — SIGNIFICANT CHANGE UP
COMMENT - URINE: SIGNIFICANT CHANGE UP
CREAT SERPL-MCNC: 2.02 MG/DL — HIGH (ref 0.5–1.3)
DIFF PNL FLD: ABNORMAL
EGFR: 36 ML/MIN/1.73M2 — LOW
EOSINOPHIL # BLD AUTO: 0.04 K/UL — SIGNIFICANT CHANGE UP (ref 0–0.5)
EOSINOPHIL NFR BLD AUTO: 0.3 % — SIGNIFICANT CHANGE UP (ref 0–6)
EPI CELLS # UR: SIGNIFICANT CHANGE UP
GAS PNL BLDA: SIGNIFICANT CHANGE UP
GAS PNL BLDA: SIGNIFICANT CHANGE UP
GLUCOSE SERPL-MCNC: 137 MG/DL — HIGH (ref 70–99)
GLUCOSE UR QL: NEGATIVE — SIGNIFICANT CHANGE UP
HCO3 BLDA-SCNC: 14 MMOL/L — LOW (ref 21–28)
HCO3 BLDA-SCNC: 17 MMOL/L — LOW (ref 21–28)
HCT VFR BLD CALC: 31.6 % — LOW (ref 39–50)
HGB BLD-MCNC: 10.2 G/DL — LOW (ref 13–17)
HOROWITZ INDEX BLDA+IHG-RTO: 100 — SIGNIFICANT CHANGE UP
HOROWITZ INDEX BLDA+IHG-RTO: 100 — SIGNIFICANT CHANGE UP
IMM GRANULOCYTES NFR BLD AUTO: 2.2 % — HIGH (ref 0–0.9)
KETONES UR-MCNC: NEGATIVE — SIGNIFICANT CHANGE UP
LACTATE SERPL-SCNC: 10 MMOL/L — CRITICAL HIGH (ref 0.7–2)
LEUKOCYTE ESTERASE UR-ACNC: ABNORMAL
LIDOCAIN IGE QN: 35 U/L — LOW (ref 73–393)
LYMPHOCYTES # BLD AUTO: 1.04 K/UL — SIGNIFICANT CHANGE UP (ref 1–3.3)
LYMPHOCYTES # BLD AUTO: 8.3 % — LOW (ref 13–44)
MAGNESIUM SERPL-MCNC: 1.9 MG/DL — SIGNIFICANT CHANGE UP (ref 1.6–2.6)
MCHC RBC-ENTMCNC: 27.1 PG — SIGNIFICANT CHANGE UP (ref 27–34)
MCHC RBC-ENTMCNC: 32.3 GM/DL — SIGNIFICANT CHANGE UP (ref 32–36)
MCV RBC AUTO: 84 FL — SIGNIFICANT CHANGE UP (ref 80–100)
MONOCYTES # BLD AUTO: 0.24 K/UL — SIGNIFICANT CHANGE UP (ref 0–0.9)
MONOCYTES NFR BLD AUTO: 1.9 % — LOW (ref 2–14)
NEUTROPHILS # BLD AUTO: 10.88 K/UL — HIGH (ref 1.8–7.4)
NEUTROPHILS NFR BLD AUTO: 86.8 % — HIGH (ref 43–77)
NITRITE UR-MCNC: NEGATIVE — SIGNIFICANT CHANGE UP
NRBC # BLD: 0 /100 WBCS — SIGNIFICANT CHANGE UP (ref 0–0)
PCO2 BLDA: 35 MMHG — SIGNIFICANT CHANGE UP (ref 35–48)
PCO2 BLDA: 44 MMHG — SIGNIFICANT CHANGE UP (ref 35–48)
PH BLDA: 7.11 — CRITICAL LOW (ref 7.35–7.45)
PH BLDA: 7.29 — LOW (ref 7.35–7.45)
PH UR: 5 — SIGNIFICANT CHANGE UP (ref 5–8)
PHOSPHATE SERPL-MCNC: 7.7 MG/DL — HIGH (ref 2.5–4.5)
PLATELET # BLD AUTO: 402 K/UL — HIGH (ref 150–400)
PO2 BLDA: 294 MMHG — HIGH (ref 83–108)
PO2 BLDA: 64 MMHG — LOW (ref 83–108)
POTASSIUM SERPL-MCNC: 5.1 MMOL/L — SIGNIFICANT CHANGE UP (ref 3.5–5.3)
POTASSIUM SERPL-SCNC: 5.1 MMOL/L — SIGNIFICANT CHANGE UP (ref 3.5–5.3)
PROT SERPL-MCNC: 5.2 G/DL — LOW (ref 6–8.3)
PROT UR-MCNC: 100
RAPID RVP RESULT: SIGNIFICANT CHANGE UP
RBC # BLD: 3.76 M/UL — LOW (ref 4.2–5.8)
RBC # FLD: 19.3 % — HIGH (ref 10.3–14.5)
RBC CASTS # UR COMP ASSIST: >50 /HPF (ref 0–4)
SAO2 % BLDA: 100 % — HIGH (ref 94–98)
SAO2 % BLDA: 86.4 % — LOW (ref 94–98)
SARS-COV-2 RNA SPEC QL NAA+PROBE: SIGNIFICANT CHANGE UP
SODIUM SERPL-SCNC: 159 MMOL/L — HIGH (ref 135–145)
SP GR SPEC: 1.02 — SIGNIFICANT CHANGE UP (ref 1.01–1.02)
TROPONIN I, HIGH SENSITIVITY RESULT: 58 NG/L — SIGNIFICANT CHANGE UP
UROBILINOGEN FLD QL: NEGATIVE — SIGNIFICANT CHANGE UP
WBC # BLD: 12.53 K/UL — HIGH (ref 3.8–10.5)
WBC # FLD AUTO: 12.53 K/UL — HIGH (ref 3.8–10.5)
WBC UR QL: >50 /HPF (ref 0–5)

## 2023-01-01 PROCEDURE — 99285 EMERGENCY DEPT VISIT HI MDM: CPT | Mod: 25

## 2023-01-01 PROCEDURE — 87040 BLOOD CULTURE FOR BACTERIA: CPT

## 2023-01-01 PROCEDURE — 80053 COMPREHEN METABOLIC PANEL: CPT

## 2023-01-01 PROCEDURE — 84100 ASSAY OF PHOSPHORUS: CPT

## 2023-01-01 PROCEDURE — 82803 BLOOD GASES ANY COMBINATION: CPT

## 2023-01-01 PROCEDURE — 83690 ASSAY OF LIPASE: CPT

## 2023-01-01 PROCEDURE — 74176 CT ABD & PELVIS W/O CONTRAST: CPT | Mod: MA

## 2023-01-01 PROCEDURE — 0225U NFCT DS DNA&RNA 21 SARSCOV2: CPT

## 2023-01-01 PROCEDURE — 93307 TTE W/O DOPPLER COMPLETE: CPT

## 2023-01-01 PROCEDURE — 71045 X-RAY EXAM CHEST 1 VIEW: CPT

## 2023-01-01 PROCEDURE — 70450 CT HEAD/BRAIN W/O DYE: CPT | Mod: MA

## 2023-01-01 PROCEDURE — 93005 ELECTROCARDIOGRAM TRACING: CPT

## 2023-01-01 PROCEDURE — 74176 CT ABD & PELVIS W/O CONTRAST: CPT | Mod: 26

## 2023-01-01 PROCEDURE — 93307 TTE W/O DOPPLER COMPLETE: CPT | Mod: 26

## 2023-01-01 PROCEDURE — 99291 CRITICAL CARE FIRST HOUR: CPT | Mod: FS,25

## 2023-01-01 PROCEDURE — 83605 ASSAY OF LACTIC ACID: CPT

## 2023-01-01 PROCEDURE — 43753 TX GASTRO INTUB W/ASP: CPT | Mod: 59

## 2023-01-01 PROCEDURE — 99223 1ST HOSP IP/OBS HIGH 75: CPT

## 2023-01-01 PROCEDURE — 96374 THER/PROPH/DIAG INJ IV PUSH: CPT

## 2023-01-01 PROCEDURE — 85025 COMPLETE CBC W/AUTO DIFF WBC: CPT

## 2023-01-01 PROCEDURE — 94002 VENT MGMT INPAT INIT DAY: CPT

## 2023-01-01 PROCEDURE — 36415 COLL VENOUS BLD VENIPUNCTURE: CPT

## 2023-01-01 PROCEDURE — 36600 WITHDRAWAL OF ARTERIAL BLOOD: CPT

## 2023-01-01 PROCEDURE — 70450 CT HEAD/BRAIN W/O DYE: CPT | Mod: 26

## 2023-01-01 PROCEDURE — 71250 CT THORAX DX C-: CPT | Mod: 26

## 2023-01-01 PROCEDURE — 84484 ASSAY OF TROPONIN QUANT: CPT

## 2023-01-01 PROCEDURE — 71045 X-RAY EXAM CHEST 1 VIEW: CPT | Mod: 26

## 2023-01-01 PROCEDURE — 81001 URINALYSIS AUTO W/SCOPE: CPT

## 2023-01-01 PROCEDURE — 71250 CT THORAX DX C-: CPT | Mod: MA

## 2023-01-01 PROCEDURE — 83735 ASSAY OF MAGNESIUM: CPT

## 2023-01-01 PROCEDURE — 99212 OFFICE O/P EST SF 10 MIN: CPT

## 2023-01-01 RX ORDER — PIPERACILLIN AND TAZOBACTAM 4; .5 G/20ML; G/20ML
3.38 INJECTION, POWDER, LYOPHILIZED, FOR SOLUTION INTRAVENOUS EVERY 8 HOURS
Refills: 0 | Status: DISCONTINUED | OUTPATIENT
Start: 2023-01-01 | End: 2023-01-01

## 2023-01-01 RX ORDER — PHENYLEPHRINE HYDROCHLORIDE 10 MG/ML
0.5 INJECTION INTRAVENOUS
Qty: 160 | Refills: 0 | Status: DISCONTINUED | OUTPATIENT
Start: 2023-01-01 | End: 2023-01-01

## 2023-01-01 RX ORDER — SODIUM CHLORIDE 9 MG/ML
2300 INJECTION INTRAMUSCULAR; INTRAVENOUS; SUBCUTANEOUS ONCE
Refills: 0 | Status: DISCONTINUED | OUTPATIENT
Start: 2023-01-01 | End: 2023-01-01

## 2023-01-01 RX ORDER — SODIUM CHLORIDE 9 MG/ML
2300 INJECTION INTRAMUSCULAR; INTRAVENOUS; SUBCUTANEOUS ONCE
Refills: 0 | Status: COMPLETED | OUTPATIENT
Start: 2023-01-01 | End: 2023-01-01

## 2023-01-01 RX ORDER — CHLORHEXIDINE GLUCONATE 213 G/1000ML
1 SOLUTION TOPICAL
Refills: 0 | Status: DISCONTINUED | OUTPATIENT
Start: 2023-01-01 | End: 2023-01-01

## 2023-01-01 RX ORDER — PIPERACILLIN AND TAZOBACTAM 4; .5 G/20ML; G/20ML
3.38 INJECTION, POWDER, LYOPHILIZED, FOR SOLUTION INTRAVENOUS ONCE
Refills: 0 | Status: COMPLETED | OUTPATIENT
Start: 2023-01-01 | End: 2023-01-01

## 2023-01-01 RX ORDER — SODIUM CHLORIDE 9 MG/ML
2300 INJECTION, SOLUTION INTRAVENOUS ONCE
Refills: 0 | Status: COMPLETED | OUTPATIENT
Start: 2023-01-01 | End: 2023-01-01

## 2023-01-01 RX ORDER — FENTANYL CITRATE 50 UG/ML
25 INJECTION INTRAVENOUS ONCE
Refills: 0 | Status: DISCONTINUED | OUTPATIENT
Start: 2023-01-01 | End: 2023-01-01

## 2023-01-01 RX ORDER — NOREPINEPHRINE BITARTRATE/D5W 8 MG/250ML
0.5 PLASTIC BAG, INJECTION (ML) INTRAVENOUS
Qty: 8 | Refills: 0 | Status: DISCONTINUED | OUTPATIENT
Start: 2023-01-01 | End: 2023-01-01

## 2023-01-01 RX ORDER — CHLORHEXIDINE GLUCONATE 213 G/1000ML
15 SOLUTION TOPICAL EVERY 12 HOURS
Refills: 0 | Status: DISCONTINUED | OUTPATIENT
Start: 2023-01-01 | End: 2023-01-01

## 2023-01-01 RX ORDER — HEPARIN SODIUM 5000 [USP'U]/ML
5000 INJECTION INTRAVENOUS; SUBCUTANEOUS EVERY 8 HOURS
Refills: 0 | Status: DISCONTINUED | OUTPATIENT
Start: 2023-01-01 | End: 2023-01-01

## 2023-01-01 RX ORDER — SODIUM BICARBONATE 1 MEQ/ML
50 SYRINGE (ML) INTRAVENOUS ONCE
Refills: 0 | Status: COMPLETED | OUTPATIENT
Start: 2023-01-01 | End: 2023-01-01

## 2023-01-01 RX ORDER — PANTOPRAZOLE SODIUM 20 MG/1
40 TABLET, DELAYED RELEASE ORAL DAILY
Refills: 0 | Status: DISCONTINUED | OUTPATIENT
Start: 2023-01-01 | End: 2023-01-01

## 2023-01-01 RX ADMIN — FENTANYL CITRATE 25 MICROGRAM(S): 50 INJECTION INTRAVENOUS at 19:26

## 2023-01-01 RX ADMIN — PIPERACILLIN AND TAZOBACTAM 200 GRAM(S): 4; .5 INJECTION, POWDER, LYOPHILIZED, FOR SOLUTION INTRAVENOUS at 15:17

## 2023-01-01 RX ADMIN — SODIUM CHLORIDE 2300 MILLILITER(S): 9 INJECTION INTRAMUSCULAR; INTRAVENOUS; SUBCUTANEOUS at 15:20

## 2023-01-01 RX ADMIN — SODIUM CHLORIDE 2300 MILLILITER(S): 9 INJECTION, SOLUTION INTRAVENOUS at 15:20

## 2023-01-01 RX ADMIN — Medication 50 MILLIEQUIVALENT(S): at 14:17

## 2023-01-01 RX ADMIN — CHLORHEXIDINE GLUCONATE 15 MILLILITER(S): 213 SOLUTION TOPICAL at 17:36

## 2023-01-01 RX ADMIN — PHENYLEPHRINE HYDROCHLORIDE 7.03 MICROGRAM(S)/KG/MIN: 10 INJECTION INTRAVENOUS at 17:35

## 2023-01-01 RX ADMIN — SODIUM CHLORIDE 2300 MILLILITER(S): 9 INJECTION INTRAMUSCULAR; INTRAVENOUS; SUBCUTANEOUS at 14:00

## 2023-01-01 RX ADMIN — Medication 70.3 MICROGRAM(S)/KG/MIN: at 14:05

## 2023-01-16 PROBLEM — Z79.899 ON PALLIATIVE ANTINEOPLASTIC CHEMOTHERAPY: Status: ACTIVE | Noted: 2023-01-01

## 2023-01-17 PROBLEM — C7A.8 NEUROENDOCRINE CANCER: Status: ACTIVE | Noted: 2021-01-08

## 2023-01-17 NOTE — ASSESSMENT
[FreeTextEntry1] : Patient is s/p MI and Neurologic decline. \par Patient will continue to follow up with his present oncologist for Octreotide injections. \par Patient will be seen within the next month with an Neurologist for complete evaluation.\par Dr. Neri Altamirano and I have offered to send patient to hospital for evaluation. However, patient's sister the health care proxy refuses because his present state of physical and mental condition has been unchanged since MI in November 2022.\par

## 2023-01-17 NOTE — PHYSICAL EXAM
[Capable of only limited self care, confined to bed or chair more than 50% of waking hours] : Status 3- Capable of only limited self care, confined to bed or chair more than 50% of waking hours [de-identified] : Alert, very agitated. patient is not orientated to person or place. [de-identified] : + sitting in wheelchair with soft boots brace to bilateral lower extremities. [de-identified] : Mental capacity is almost child like, and speech is incoherent.

## 2023-01-17 NOTE — HISTORY OF PRESENT ILLNESS
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 The patient's history dates back to March 2013 when he presented with abdominal pain and was found to have liver metastases. The biopsy revealed well differentiated neuroendocrine tumor. He was found to have bone involvement at that time. His treatment consisted of monthly injections of Sandostatin. In April 2015 patient developed increasing abdominal pain and was found to have a bowel obstruction requiring small bowel resection. The pathology again revealed well-differentiated neuroendocrine tumor which is low grade involving the jejunum. The tumor measured 1.2 cm. Margins were negative. There was metastases in one of 3 lymph nodes.\par \par The patient then was well until March 2018 when he developed neck pain and was found to have an intramedullary lesion in C6-7. This required radiation to the spine in 10 fractions He subsequently developed myelopathy requiring IV steroids. In June 2018 he was hospitalized because of decreasing neurologic function during a steroid taper. In October 2018 he was hospitalized in [facility] tdue to septic shock, atrial fibrillation and altered mental status. He also developed cellulitis and retroperitoneal hematoma. In December 2018 he developed increasing shortness of breath due to a left pleural effusion which was negative on cytology.\par \par The patient subsequently has been stable and maintained on monthly injections of Sandostatin 30 mg which he tolerates well. At the present time his condition and disease has been controlled. However he does have occasional arm and leg tingling and has undergone neurologic followup. The patient currently is in rehabilitation undergoing physical therapy. He has some left leg movement but otherwise is hemiplegic. The patient has been off all steroids for about one year. He has not demonstrated signs or symptoms of carcinoid syndrome. He does note intermittent muscle-type pain in his back, left wrist and left upper extremity. The patient has been on several toe which was started one year ago for a blood clot in the leg. He has undergone recent CAT scans of the chest abdomen and pelvis which have basically shown stable disease. He is currently transferring his medical care to a change in insurance.\par \par  [de-identified] : well diff NET [de-identified] : Patient is here for f/u. Patient has not been seen in our office 7/2021. As per Aide and sister via phone, patient had a MI in November 2022. He was hospitalized at [facility], then he was placed in Rehab center for several months. Presently, patient is unable to speak, unable express his needs. and his mental capacity has deteriorated significantly. As per sister, patient has an oncologist Dr. Vasile Dang who provides the monthly injections of Octreotide, and his aide/visiting nurse did not arrange the correct appointment today. Also, he has an appointment with a new Neurologist within 1 month.

## 2023-01-17 NOTE — REVIEW OF SYSTEMS
[FreeTextEntry2] : unable to assess ROS. patient is unable to express his needs due to incoherent speech.

## 2023-02-27 NOTE — CHART NOTE - NSCHARTNOTEFT_GEN_A_CORE
At~1800, extensive goals of care conversation reinitiated by patient sister and HCP Summer as she was contacted regarding need for cvc. Per Summer she feels that the patient has been suffering for some time and does not want to make him suffer any longer (as he has been for years). Family educated on pt clinical status extensively and Summer stated she wants to approach his care with likely a more comfort approach, likely a palliative withdrawal, to allow him to die in dignity given his grave prognosis, however, she just wants to confirm this with his children and other family members. Awaiting call back for instructions on how to proceed.
Multiple further conversations had with pt sister / HCP Summer and all questions answered. At 1851 she called back, again all questions answered, and decision to make pt DNR with no invasive procedures or blood draws at this time pending the arrival of his children. Once his children arrive, plan to palliatively wean the patient off mechanical ventilation and vasoactive gtts. DREW filled out with above, with plan to add DNI and comfort measures status after his children arrive. Summer states she is aware that we have limited IV access and that should we lose this access he would not be able to further get these life preserving medications and she verbalizes understanding of this, and does not want any invasive procedures as she wants him to be as comfortable as possible.

## 2023-02-27 NOTE — ED PROVIDER NOTE - ATTENDING CONTRIBUTION TO CARE
Eval with ANA MARIA Hurtado. 62-year-old male that as per EMS patient was last seen alive at 1257 hrs., was found unresponsive and pulseless at 1308 hrs. as per EMS on arrival he was in asystole, administered 3 doses of Epinephrine and achieved ROSC. Patient arrived in ED intubated at 1353 hrs. with a palpable pulse, intubated and being ventilated via BVM, with Levophed running, via intraosseous line in the left lower leg.    ANA MARIA Hurtado discussed with pt emergency contact, who discussed wish for continued resuscitation. Prognosis communicated as poor. ICU notified. TTM not recommended. Will admit to ICU. CT head/Chest/abd/Pelvis ordered. ICU team will f/u. Pt condition guarded. No sedatives required. Pt without response to noxious stimuli. See progress note.     I performed a face to face bedside interview with patient regarding history of present illness, review of symptoms and past medical history. I completed an independent physical exam.  I have discussed the patient's plan of care with Physician Assistant (PA). I agree with note as stated above, having amended the EMR as needed to reflect my findings.   This includes History of Present Illness, HIV, Past Medical/Surgical/Family/Social History, Allergies and Home Medications, Review of Systems, Physical Exam, and any Progress Notes during the time I functioned as the attending physician for this patient. '    Patient was critically ill with a high probability of imminent or life threatening deterioration.  direct patient care (not related to procedure), additional history taking, interpretation of diagnostic studies, documentation, consultation with other physicians, telephone consultation with the patient's family  I have personally provided the amount of critical care time documented below excluding time spent on separate procedures.

## 2023-02-27 NOTE — H&P ADULT - NSHPPHYSICALEXAM_GEN_ALL_CORE
General: No acute distress.  Intubated and Sedated.  GENERAL: unresponsive male, ill appearing, ett to vent  HEENT: Pupils equal 1-2 mm, non reactive. No corneal reflex noted.    PULM: Mechanical BS noted to auscultation bilaterally. Equal chest expansion noted.   CVS: Irregular rate / rhythm, s1/s2 noted. Cold to touch.   ABD: Soft, nondistended, no masses.  EXT: No edema. Mottled skin, blue tinged. Cold to touch.   MSK: decreased ROM in extremities x4, no moment of extremities even to noxious stimuli.   SKIN: Cool, poor perfusion noted.   PSYCH: JOSH 2/2 unresponsive, intubated.

## 2023-02-27 NOTE — PATIENT PROFILE ADULT - FALL HARM RISK - HARM RISK INTERVENTIONS

## 2023-02-27 NOTE — GOALS OF CARE CONVERSATION - ADVANCED CARE PLANNING - CONVERSATION DETAILS
Patient sister readdressed [facility] after seeing the acuity and high level of care to sustain life for patient. After extensive conversation and all questions answered, HCP / sister Summer is deciding to make pt DNR until his children arrive with no further lab draws and no invasive procedures with the understanding should we lose IV access he may pass without vasopressor support with the plan to palliatively wean pt from mechanical ventilation and pressor support and allow him to pass naturally.      She states that he has been suffering for a long time and does not want him to suffer any longer and wants to ensure he is comfortable at the end of his life.    DREW filled out with the above with plan to further edit after the children are able to visit with patient and say goodbye. I was able to facetime with Summer to allow for her to say goodbye as well.

## 2023-02-27 NOTE — H&P ADULT - NS ATTEND AMEND GEN_ALL_CORE FT
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, ?OM of sacrum, s/p recent debridement of pressure ulcer , afib, dysphagia s/p aspiration event 4.5 months ago with hypoxic respiratory failure requiring ~3 weeks of intubation, and prior COVID (12/22) admitted to ICU for tx of: Pat noted in cardiac arrest and CPR >40 min as per EMS         ASSESSMENT   - Cardiac Arrest,   - Cardiogenic shock   - Prolonged with concern for anoxic brain injury  - Hypoxemic respiratory failure requiring MV  - MODS / Shock state, appears hypovolemic +/- septic +/- cardiogenic  - PNA Asp   - Infected pressure ulcer   - UTI  - SHASHANK  - Elevated lactic acidosis   - Hypernatremia             Plan as below:  - Admit to ICU  - Pain and sedation as needed for comfort on vent    - Continue vent support , adjust per ABG   - Hemodynamic support   - Titrate vasopressors to maintain MAP>65, will change levo to pheny for tachycardia   - IV antibx  to cover asp pna and sepsis   - TTE, read pending  - Cards eval   - NPO   - PPI   - Olvera catheter adjusted for appropriate placement; monitor u/o, bun/creatinine, avoid nephrotoxic agents  - F/U  labs , trend as needed; also sending thyroid screening and procalcitonin  - DVT GI prophy   - Palliative eval      Pt not a candidate for hypothermia protocol given extended cardiac arrest time and metastatic disease.     Family aware of pt critical status and poor prognosis given extensive downtime and baseline

## 2023-02-27 NOTE — ED PROVIDER NOTE - CLINICAL SUMMARY MEDICAL DECISION MAKING FREE TEXT BOX
Today 62-year-old male sent in from nursing home according to EMS patient was less seen alive at 1257 hrs., was found unresponsive and pulseless at 1308 hrs. as per EMS on arrival he was in asystole, patient was intubated, CPR is in progress, Per EMS reports getting ROSC prior to arrival in the ED, patient arrives in the ED at 1353 hrs., with a palpable pulse, intubated and being ventilated via BVM, with Levophed running, via intraosseous line in the left lower leg. 62-year-old male that as per EMS patient was last seen alive at 1257 hrs., was found unresponsive and pulseless at 1308 hrs. as per EMS on arrival he was in asystole, administered 3 doses of Epinephrine and achieved ROSC. Patient arrived in ED intubated at 1353 hrs. with a palpable pulse, intubated and being ventilated via BVM, with Levophed running, via intraosseous line in the left lower leg. 62-year-old male that as per EMS patient was last seen alive at 1257 hrs., was found unresponsive and pulseless at 1308 hrs. as per EMS on arrival he was in asystole, administered 3 doses of Epinephrine and achieved ROSC. Patient arrived in ED intubated at 1353 hrs. with a palpable pulse, intubated and being ventilated via BVM, with Levophed running, via intraosseous line in the left lower leg.    ANA MARIA Hurtado discussed with pt emergency contact, who discussed wish for continued resuscitation. Prognosis communicated as poor. ICU notified. TTM not recommended. Will admit to ICU. CT head/Chest/abd/Pelvis ordered. ICU team will f/u. Pt condition guarded. No sedatives required. Pt without response to noxious stimuli. See progress note.

## 2023-02-27 NOTE — ED ADULT NURSE NOTE - ASSOCIATED SYMPTOMS
Patient apneic, palpable pulse, unresponsive on arrival. Patient has mottled, purple lower extremities. Patient is paralyzed from the waist down at baseline. Patient has sacral pressure ulcer on arrival to ED.

## 2023-02-27 NOTE — CONSULT NOTE ADULT - SUBJECTIVE AND OBJECTIVE BOX
SAM WEI  [id number]      HPI:    Sam Wei is a 62 year old man with past medical history of Neuroendocrine tumor with bone metastases and resultant paralysis, Coronary artery disease (s/p myocardial infarction in 11/2022, treated at [facility]), Atrial fibrillation and history of aspiration and hypoxic respiratory failure requiring intubation who was brought to [facility] after suffering cardiac arrest at the nursing home.    Chart review indicates that patient was found unresponsive and CPR was initiated and continued for about 45 minutes until ROSC achieved, patient currently intubated and on norepinephrine drip.       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  S/P small bowel resection        CURRENT MEDICATIONS:  chlorhexidine 2% Cloths 1 Application(s) Topical <User Schedule>  norepinephrine Infusion 0.5 MICROgram(s)/kG/Min IV Continuous <Continuous>      ROS:  All 10 systems reviewed and positives noted in HPI    OBJECTIVE:    VITAL SIGNS:  Vital Signs Last 24 Hrs  T(C): --  T(F): --  HR: 75 (27 Feb 2023 15:30) (74 - 103)  BP: 118/49 (27 Feb 2023 15:30) (72/41 - 137/105)  BP(mean): 66 (27 Feb 2023 15:30) (39 - 115)  RR: 25 (27 Feb 2023 15:30) (20 - 29)  SpO2: 100% (27 Feb 2023 15:30) (92% - 100%)    Parameters below as of 27 Feb 2023 15:30  Patient On (Oxygen Delivery Method): ventilator  O2 Flow (L/min): 17      PHYSICAL EXAM:  General: well appearing, no distress  HEENT: sclera anicteric  Neck: supple, no carotid bruits b/l  CVS: JVP ~ 7 cm H20, RRR, s1, s2, no murmurs/rubs/gallops  Chest: unlabored respirations, clear to auscultation b/l  Abdomen: non-distended  Extremities: no lower extremity edema b/l  Neuro: awake, alert & oriented x 3  Psych: normal affect      LABS:                        10.2   12.53 )-----------( 402      ( 27 Feb 2023 14:10 )             31.6     02-27    159<H>  |  111<H>  |  46<H>  ----------------------------<  137<H>  5.1   |  33<H>  |  2.02<H>    Ca    8.0<L>      27 Feb 2023 14:10  Phos  7.7     02-27  Mg     1.9     02-27    TPro  5.2<L>  /  Alb  1.3<L>  /  TBili  0.7  /  DBili  x   /  AST  108<H>  /  ALT  40  /  AlkPhos  241<H>  02-27      ECG (2/27/23): sinus rhythm, low voltage, possible old inferior infarct, anterior infarct pattern (new)   SAM WEI  [id number]      HPI:    Sam Wei is a 62 year old man with past medical history of Neuroendocrine tumor with bone metastases and resultant paralysis, Coronary artery disease (s/p myocardial infarction in 11/2022, treated at [facility]), Atrial fibrillation and history of aspiration and hypoxic respiratory failure requiring intubation who was brought to [facility] after suffering cardiac arrest at the nursing home.    Chart review indicates that patient was found unresponsive and CPR was initiated and continued for about 45 minutes until ROSC achieved by EMS, patient currently intubated and sedated, on norepinephrine drip in ICU.    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  S/P small bowel resection        CURRENT MEDICATIONS:  chlorhexidine 2% Cloths 1 Application(s) Topical <User Schedule>  norepinephrine Infusion 0.5 MICROgram(s)/kG/Min IV Continuous <Continuous>      ROS:  All 10 systems reviewed and positives noted in HPI    OBJECTIVE:    VITAL SIGNS:  Vital Signs Last 24 Hrs  T(C): --  T(F): --  HR: 75 (27 Feb 2023 15:30) (74 - 103)  BP: 118/49 (27 Feb 2023 15:30) (72/41 - 137/105)  BP(mean): 66 (27 Feb 2023 15:30) (39 - 115)  RR: 25 (27 Feb 2023 15:30) (20 - 29)  SpO2: 100% (27 Feb 2023 15:30) (92% - 100%)    Parameters below as of 27 Feb 2023 15:30  Patient On (Oxygen Delivery Method): ventilator  O2 Flow (L/min): 17      PHYSICAL EXAM:  General: intubated, sedated  HEENT: sclera anicteric  Neck: supple  CVS: JVP difficult to assess 2/2 intubation collar, tachycardic, s1, s2  Chest: decreased breath sounds  Extremities: no lower extremity edema b/l, mottled lower extremities/feet with poor perfusion   Neuro: intubated, sedated      LABS:                        10.2   12.53 )-----------( 402      ( 27 Feb 2023 14:10 )             31.6     02-27    159<H>  |  111<H>  |  46<H>  ----------------------------<  137<H>  5.1   |  33<H>  |  2.02<H>    Ca    8.0<L>      27 Feb 2023 14:10  Phos  7.7     02-27  Mg     1.9     02-27    TPro  5.2<L>  /  Alb  1.3<L>  /  TBili  0.7  /  DBili  x   /  AST  108<H>  /  ALT  40  /  AlkPhos  241<H>  02-27      ECG (2/27/23): sinus rhythm, low voltage, possible old inferior infarct, anterior infarct pattern (new)      CT Abdomen/Pelvis (2/27/23):  Multifocal bilateral pneumonia. Cannot definitely exclude underlying lung metastases.  Numerous hepatic metastases.  Mild mediastinal and retroperitoneal lymphadenopathy, stable or improved.  Malpositioned Olvera catheter.  Additional findings as above.

## 2023-02-27 NOTE — INITIAL ORGAN DONATION REFERRAL - NSORGANDONATIONCLINICALTRIGGER_GEN_ALL_CORE
Absence of TWO or more brain stem reflexes/[place] Coma Scale is less than or equal to 5/Family discussion withdrawal of life-sustaining therapies is anticipated

## 2023-02-27 NOTE — CONSULT NOTE ADULT - ASSESSMENT
Assessment:  Tripp Wei is a 62 year old man with past medical history of Neuroendocrine tumor with bone metastases and resultant paralysis, Coronary artery disease (s/p myocardial infarction in 11/2022, treated at [facility]), Atrial fibrillation and history of aspiration and hypoxic respiratory failure requiring intubation who was brought to [facility] after suffering cardiac arrest at the nursing home.    Chart review indicates that patient was found unresponsive and CPR was initiated and continued for about 45 minutes until ROSC achieved, patient currently intubated and on norepinephrine drip.     Cardiology consulted due to recent cardiac arrest. Assessment:  Tripp Wei is a 62 year old man with past medical history of Neuroendocrine tumor with bone metastases and resultant paralysis, Coronary artery disease (s/p myocardial infarction in 11/2022, treated at [facility]), Atrial fibrillation and history of aspiration and hypoxic respiratory failure requiring intubation who was brought to [facility] after suffering cardiac arrest at the nursing home, currently in acute respiratory failure secondary to multifocal pneumonia requiring intubation and with metabolic acidosis, renal injury and overall multiorgan injury.     Chart review indicates that patient was found unresponsive and CPR was initiated and continued for about 45 minutes until ROSC achieved by EMS, patient currently intubated and on norepinephrine drip. ECG on admission consistent with sinus rhythm, low voltage, old inferior infarct and new anterior infarct pattern. Troponin not significantly elevated.     Recommendations:  [] Cardiac arrest: Appears was likely PEA arrest per ICU team, likely multifactorial in etiology. Stat echocardiogram being done now. Continue ventilator support and pressor support to maintain MAP > 65.   [] Recommend infectious workup, blood and urine cultures, antibiotics per ICU/ID    Prognosis appears guarded at this time due to neuroendocrine tumor with multiple metastases, recommend goals of care discussions. Discussed with ICU team.    We will continue to follow along.    Irene Muir MD  Cardiology

## 2023-02-27 NOTE — PATIENT PROFILE ADULT - FUNCTIONAL ASSESSMENT - DAILY ACTIVITY 6.
Report given to floor rn, room dirty
resp at bedside for flu panel
resp at bedside for tx
tx complete, no distress noted
1 = Total assistance

## 2023-02-27 NOTE — DISCHARGE NOTE FOR THE EXPIRED PATIENT - HOSPITAL COURSE
Mr. 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 Per EMS pt was found unresponsive after he was not seen for approximately 10-11 minutes and CPR was initiated. Per report, CPR continued for an additional approximately 40-45 minutes until arrival to the ED when ROSC was achieved. Pt intubated and on norepinephrine gtt via IO. Pt was admitted to ICU for tx of MODS, Septic / Hypovolemic / Cardiogenic shock. As pt sister and HCP Summer saw that his condition was worsening and not improving and was likely going to require invasive procedures etc to sustain life she decided given his long term suffering given his extensive medical history including but not limited to complete lower extremity paralysis rendering him bedbound, infected decubitus ulcer and extensive ca with mets she no longer wants him to suffer and thus made him DNR, no law draws and no invasive procedures with plan to withdraw care and make him comfort measures only, except pt went into cardiac ventricular arrhythmia on his own and he passed, He was unresponsive, with no s1/s2 noted, no chest rise noted. Pt was asystole on the monitor and pronounced at 1938. Summer was called immediately and then facetimed per her request to again say goodbye. condolences were offered at that time. Children are no longer going to come per Summer. Attending ICU physician made aware of the above.  Mr. 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 s/p cardiac arrest at NH. Per EMS pt was found unresponsive after he was not seen for approximately 10-11 minutes and CPR was initiated. Per report, CPR continued for an additional approximately 40-45 minutes until arrival to the ED when ROSC was achieved. Pt intubated and on norepinephrine gtt via IO. Pt was admitted to ICU for tx of MODS, Septic / Hypovolemic / Cardiogenic shock. As pt sister and HCP Summer saw that his condition was worsening and not improving and was likely going to require invasive procedures etc to sustain life she decided given his long term suffering given his extensive medical history including but not limited to complete lower extremity paralysis rendering him bedbound, infected decubitus ulcer and extensive ca with mets she no longer wants him to suffer and thus made him DNR, no lab draws and no invasive procedures with plan to withdraw care and make him comfort measures only, except pt went into cardiac ventricular arrhythmia on his own and he passed, He was unresponsive, with no s1/s2 noted, no chest rise noted. Pt was asystole on the monitor and pronounced at 1938. Summer was called immediately and then facetimed per her request to again say goodbye. condolences were offered at that time. Children are no longer going to come per Summer. Attending ICU physician made aware of the above.     for a full account of hospital course please refer to actual medical records for this is a brief summery

## 2023-02-27 NOTE — ED PROVIDER NOTE - PROGRESS NOTE DETAILS
Spoke with patient's sister and healthcare proxy Summer Burnhamzenobia ([phone]): Confirmed Full Code, made aware of critical status of patient, will attempt to come to hospital from [place].  Provided that patient has a history of Neuroendocrine carcinoma, A-Fib, lower extremity paralysis, Seizure disorder, aspiration, Spoke with patient's sister and healthcare proxy Summer Fang ([phone]): Confirmed Full Code, made aware of critical status of patient, will attempt to come to hospital from [place].  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

## 2023-02-27 NOTE — PATIENT PROFILE ADULT - FUNCTIONAL ASSESSMENT - DAILY ACTIVITY 1.
"Chief Complaint   Patient presents with     Back Pain       Initial /73 mmHg  Pulse 98  Temp(Src) 99  F (37.2  C) (Tympanic)  Ht 6' 2\" (1.88 m)  Wt 256 lb 6.4 oz (116.302 kg)  BMI 32.91 kg/m2  SpO2 96% Estimated body mass index is 32.91 kg/(m^2) as calculated from the following:    Height as of this encounter: 6' 2\" (1.88 m).    Weight as of this encounter: 256 lb 6.4 oz (116.302 kg).  Medication Reconciliation: complete     Sandrita Zazueta MA      "
1 = Total assistance

## 2023-02-27 NOTE — ED PROVIDER NOTE - OBJECTIVE STATEMENT
62-year-old male that as per EMS patient was last seen alive at 1257 hrs., was found unresponsive and pulseless at 1308 hrs. as per EMS on arrival he was in asystole, administered 3 doses of Epinephrine and achieved ROSC. Patient arrived in ED intubated at 1353 hrs. with a palpable pulse, intubated and being ventilated via BVM, with Levophed running, via intraosseous line in the left lower leg.

## 2023-02-27 NOTE — ED ADULT NURSE NOTE - NSIMPLEMENTINTERV_GEN_ALL_ED
Implemented All Fall Risk Interventions:  [place] to call system. Call bell, personal items and telephone within reach. Instruct patient to call for assistance. Room bathroom lighting operational. Non-slip footwear when patient is off stretcher. Physically safe environment: no spills, clutter or unnecessary equipment. Stretcher in lowest position, wheels locked, appropriate side rails in place. Provide visual cue, wrist band, yellow gown, etc. Monitor gait and stability. Monitor for mental status changes and reorient to person, place, and time. Review medications for side effects contributing to fall risk. Reinforce activity limits and safety measures with patient and family.

## 2023-02-27 NOTE — H&P ADULT - HISTORY OF PRESENT ILLNESS
Mr. 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 Per EMS pt was found unresponsive after he was not seen for approximately 10-11 minutes and CPR was initiated. Per report, CPR continued for an additional approximately 40-45 minutes until arrival to the ED when ROSC was achieved. Pt intubated and on norepinephrine gtt via IO. Pt is clearly Full code per his sister Melissa. ICU consulted.    Mr. 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 Per EMS pt was found unresponsive after he was not seen for approximately 10-11 minutes and CPR was initiated. Per report, CPR continued for an additional approximately 40-45 minutes until arrival to the ED when ROSC was achieved. Pt intubated and on norepinephrine gtt via IO. Pt is clearly Full code per his sister Melissa. ICU consulted.

## 2023-02-27 NOTE — DISCHARGE NOTE FOR THE EXPIRED PATIENT - NS PATIENT DEATH CRITERIA
Unresponsive, asystole, no s1/s2 noted, no chest rise/Patient is dead based on Cardiopulmonary criteria including absent breath sounds, pulselessness and/or asystole

## 2023-02-27 NOTE — H&P ADULT - ASSESSMENT
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, ?OM of sacrum, s/p recent debridement of PU, afib, dysphagia s/p aspiration event 4.5 months ago with hypoxic respiratory failure requiring ~3 weeks of intubation, and prior COVID (12/22) admitted to ICU for tx of:      Cardiac Arrest, prolonged with concern for anoxic brain injury  Hypoxemic respiratory failure requiring MV  MODS / Shock state, appears hypovolemic +/- cardiogenic +/- septic   PNA  Infected PU  SHASHANK  [place]  Hypernatremia  Underlying cancer  GOC    PMH as above      Plan as below:  --admit to ICU, monitor vital signs, neuro status, tele rhythm and o2 sat  --maintain ett, adjust vent settings as needed; abg prn  --continue levo gtt to maintain MAP >65 mmHg, titrated as needed  --co 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, ?OM of sacrum, s/p recent debridement of PU, afib, dysphagia s/p aspiration event 4.5 months ago with hypoxic respiratory failure requiring ~3 weeks of intubation, and prior COVID (12/22) admitted to ICU for tx of:      Cardiac Arrest, prolonged with concern for anoxic brain injury  Hypoxemic respiratory failure requiring MV  MODS / Shock state, appears hypovolemic +/- septic +/- cardiogenic  PNA  Infected PU  UTI  SHASHANK  LA  Hypernatremia  Underlying cancer with extensive mets  GOC    PMH as above      Plan as below:  --admit to ICU, monitor vital signs, neuro status, tele rhythm and o2 sat  --maintain ett, adjust vent settings as needed; abg prn  --continue levo gtt to maintain MAP >65 mmHg, titrated as needed; adding phenylephrine for tachycardia / arrhythmia  --continue zosyn for empiric coverage for now, CT consistent with b/l scattered PNA; +UTI and ?subcutaneous source 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, ?OM of sacrum, s/p recent debridement of PU, afib, dysphagia s/p aspiration event 4.5 months ago with hypoxic respiratory failure requiring ~3 weeks of intubation, and prior COVID (12/22) admitted to ICU for tx of:      Cardiac Arrest, prolonged with concern for anoxic brain injury  Hypoxemic respiratory failure requiring MV  MODS / Shock state, appears hypovolemic +/- septic +/- cardiogenic  PNA  Infected PU  UTI  SHASHANK  LA  Hypernatremia  Underlying cancer with extensive mets  GOC    PMH as above      Plan as below:  --admit to ICU, monitor vital signs, neuro status, tele rhythm and o2 sat; fup ct head results   --maintain ett, adjust vent settings as needed; abg prn  --continue levo gtt to maintain MAP >65 mmHg, titrated as needed; adding phenylephrine for tachycardia / arrhythmia; s/p IVF resuscitation in ED  --continue zosyn for empiric coverage for now, CT consistent with b/l scattered PNA; +UTI and ?subcutaneous source  --TTE, read pending  --bajwa catheter adjusted for appropriate placement; monitor u/o, bun/creatinine, avoid nephrotoxic agents  --fup labs , trend as needed; also sending thyroid screening and procalcitonin  --dvt/sup   --GOC  --pt family education / emotional support     Family aware of pt critical status and poor prognosis given extensive downtime and baseline status as noted above.       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, ?OM of sacrum, s/p recent debridement of PU, afib, dysphagia s/p aspiration event 4.5 months ago with hypoxic respiratory failure requiring ~3 weeks of intubation, and prior COVID (12/22) admitted to ICU for tx of:      Cardiac Arrest, prolonged with concern for anoxic brain injury  Hypoxemic respiratory failure requiring MV  MODS / Shock state, appears hypovolemic +/- septic +/- cardiogenic  PNA  Infected PU  UTI  SHASHANK  [place]  Hypernatremia  Underlying cancer with extensive mets  GOC    PMH as above      Plan as below:  --admit to ICU, monitor vital signs, neuro status, tele rhythm and o2 sat; fup ct head results   --maintain ett, adjust vent settings as needed; abg prn  --continue levo gtt to maintain MAP >65 mmHg, titrated as needed; adding phenylephrine for tachycardia / arrhythmia; s/p IVF resuscitation in ED  --continue zosyn for empiric coverage for now, CT consistent with b/l scattered PNA; +UTI and ?subcutaneous source  --TTE, read pending  --bajwa catheter adjusted for appropriate placement; monitor u/o, bun/creatinine, avoid nephrotoxic agents  --fup labs , trend as needed; also sending thyroid screening and procalcitonin  --dvt/sup   --GOC  --pt family education / emotional support     Pt not a candidate for hypothermia protocol given extended cardiac arrest time and metastatic disease.     Family aware of pt critical status and poor prognosis given extensive downtime and baseline status as noted above.       CCT 60 minutes

## 2023-02-27 NOTE — H&P ADULT - NSHPLABSRESULTS_GEN_ALL_CORE
CBC Full  -  ( 27 Feb 2023 14:10 )  WBC Count : 12.53 K/uL  RBC Count : 3.76 M/uL  Hemoglobin : 10.2 g/dL  Hematocrit : 31.6 %  Platelet Count - Automated : 402 K/uL  Mean Cell Volume : 84.0 fl  Mean Cell Hemoglobin : 27.1 pg  Mean Cell Hemoglobin Concentration : 32.3 gm/dL      159<H>  |  111<H>  |  46<H>  ----------------------------<  137<H>  5.1   |  33<H>  |  2.02<H>    Ca    8.0<L>      27 Feb 2023 14:10  Phos  7.7     02-27  Mg     1.9     02-27    TPro  5.2<L>  /  Alb  1.3<L>  /  TBili  0.7  /  DBili  x   /  AST  108<H>  /  ALT  40  /  AlkPhos  241<H>  02-27    LIVER FUNCTIONS - ( 27 Feb 2023 14:10 )  Alb: 1.3 g/dL / Pro: 5.2 g/dL / ALK PHOS: 241 U/L / ALT: 40 U/L / AST: 108 U/L / GGT: x           Lactate, Blood: 10.0 mmol/L (02.27.23 @ 14:10)    ABG - ( 27 Feb 2023 14:10 )  pH, Arterial: 7.11  pH, Blood: x     /  pCO2: 44    /  pO2: 64    / HCO3: 14    / Base Excess: -15.5 /  SaO2: 86.4        RADIOLOGY  CT PAN SCAN PENDING AT THIS TIME

## 2023-02-27 NOTE — ED ADULT NURSE NOTE - OBJECTIVE STATEMENT
Patient presents to ED in cardiac arrest. Prior to arrival EMS dispatched to [facility] for a 62 year old male last known well at 12:55pm. Staff states they went to check on him at 1308 and patient was apneic, pulseless, unresponsive. Patient intubated in the field, given 3 epi via an IO in the left tibfib, and started on 4mg in 1000ml via IO line. On arrival to ED patient was AFib on monitor at 60.  Patient apneic, with positive palpable pulse, unresponsive on arrival.

## 2023-02-27 NOTE — PROVIDER CONTACT NOTE (EICU) - SITUATION
I spoke with bedside RN on camera after finding of malpositioned bajwa cath with baloon inflated in prostatic uretra seen on CT scan this PM.  Currently no urine drainage in bajwa observed.   I recommended Rn to reposition the bajwa cath and check UO

## 2023-02-27 NOTE — ED PROVIDER NOTE - NSICDXPASTMEDICALHX_GEN_ALL_CORE_FT
PAST MEDICAL HISTORY:  Anxiety     Endocrine tumor     Essential hypertension     Metastatic cancer     Myelopathy due to another disorder     Small bowel obstruction     Spasticity     Steroid-induced hyperglycemia

## 2023-02-27 NOTE — DISCHARGE NOTE FOR THE EXPIRED PATIENT - NS PRELIMINARY CAUSE OF DEATH_RESTRICTED
Cancer/Cardiopulmonary Arrest/Heart Failure/Multi-organ Dysfunction Syndrome/Respiratory Failure/Sepsis

## 2023-03-02 NOTE — ED PROCEDURE NOTE - CPROC ED ARTER LINE DETAIL1
Using sterile technique, the correct location was identified, and a needle was inserted into the artery (specify in FT)./Positive blood return was obtained via the catheter./Hemostasis was achieved with direct pressure, and a dry sterile dressing applied./Ultrasound guidance was used.

## 2023-03-02 NOTE — ED PROCEDURE NOTE - CPROC ED TIME OUT STATEMENT1
“Patient's name, , procedure and correct site were confirmed during the [place] Timeout.”
“Patient's name, , procedure and correct site were confirmed during the [place] Timeout.”

## 2023-03-04 LAB
CULTURE RESULTS: SIGNIFICANT CHANGE UP
CULTURE RESULTS: SIGNIFICANT CHANGE UP
SPECIMEN SOURCE: SIGNIFICANT CHANGE UP
SPECIMEN SOURCE: SIGNIFICANT CHANGE UP